# Patient Record
Sex: FEMALE | Race: WHITE | NOT HISPANIC OR LATINO
[De-identification: names, ages, dates, MRNs, and addresses within clinical notes are randomized per-mention and may not be internally consistent; named-entity substitution may affect disease eponyms.]

---

## 2022-09-26 ENCOUNTER — NON-APPOINTMENT (OUTPATIENT)
Age: 32
End: 2022-09-26

## 2022-09-26 ENCOUNTER — APPOINTMENT (OUTPATIENT)
Dept: MRI IMAGING | Facility: CLINIC | Age: 32
End: 2022-09-26

## 2022-09-26 ENCOUNTER — APPOINTMENT (OUTPATIENT)
Dept: ORTHOPEDIC SURGERY | Facility: CLINIC | Age: 32
End: 2022-09-26

## 2022-09-26 ENCOUNTER — OUTPATIENT (OUTPATIENT)
Dept: OUTPATIENT SERVICES | Facility: HOSPITAL | Age: 32
LOS: 1 days | End: 2022-09-26
Payer: COMMERCIAL

## 2022-09-26 DIAGNOSIS — Z00.00 ENCOUNTER FOR GENERAL ADULT MEDICAL EXAMINATION WITHOUT ABNORMAL FINDINGS: ICD-10-CM

## 2022-09-26 RX ORDER — ASPIRIN 325 MG/1
325 TABLET, FILM COATED ORAL DAILY
Qty: 30 | Refills: 1 | Status: ACTIVE | COMMUNITY
Start: 2022-09-26 | End: 1900-01-01

## 2022-09-26 NOTE — ADDENDUM
[FreeTextEntry1] : I, Leatha Magana, acted solely as a scribe for Dr. Chacho Espinoza on this date 09/26/2022.\par \par All medical record entries made by the Scribe were at my, Dr. Chacho Espinoza, direction and personally dictated by me on 09/26/2022. I have reviewed the chart and agree that the record accurately reflects my personal performance of the history, physical exam, assessment and plan. I have also personally directed, reviewed, and agreed with the chart.	\par

## 2022-09-26 NOTE — HISTORY OF PRESENT ILLNESS
[FreeTextEntry1] : The patient is a 31 year old female presenting with her spouse for an initial evaluation of left Achilles injury sustained yesterday. The patient reports that while playing soccer she felt as if she was kicked in the posterior aspect of her ankle. She confirms recent antibiotic utilization with respect to a Penicillin type derivative as well as iron. She obtained x-rays at an outside facility and presents today for follow-up. The patient presents ambulating with crutches. Of note, the patient is 4 months post partum and is currently breastfeeding. Pain is localized over the Achilles, with the patient rating her pain as a 3 out of 10. She notes throbbing sensations over her Achilles. No other complaints.

## 2022-09-26 NOTE — PHYSICAL EXAM
[de-identified] : General: Alert and oriented x3. In no acute distress. Pleasant in nature with a normal affect. No apparent respiratory distress.\par \par Left Ankle Exam\par Skin: Clean, dry, intact\par Inspection: No obvious malalignment, + swelling, no effusion; no lymphadenopathy\par Pulses: 2+ DP/PT pulses\par ROM:10 degrees of dorsiflexion, 40 degrees of plantarflexion, 10 degrees of subtalar motion\par Tenderness: Palpable defect middle to proximal third. No tenderness over the lateral malleolus, no CFL/ATFL/PTFL pain. No medial malleolus pain, no deltoid ligament pain. No proximal fibular pain. No heel pain.\par Stability: Negative anterior/posterior drawer.\par Strength: 5/5 TA/GS/EHL\par Neuro: In tact to light touch throughout\par Additional tests: Negative Mortons test, Negative syndesmosis squeeze test. Positive Hernandes's test.  [de-identified] : Xrays of left ankle obtained from outside facility on 9/25/2022 and reviewed in the office today, 09/26/2022 , revealed: No fractures seen. \par

## 2022-09-26 NOTE — DISCUSSION/SUMMARY
[de-identified] : Today I had a lengthy discussion with the patient regarding their left Achilles rupture. I have addressed all the patient's concerns surrounding the pathology of their condition. XR imaging results were reviewed with the patient. We discussed both operative and non-operative treatment options in great detail. At this time I would like to obtain advanced imaging of the patient's left ankle. An MRI was ordered so I can find out more about the etiology of the patient's condition. The patient should follow up with the office after obtaining the MRI.	\par \par In the interim, I recommended that the patient utilize a CAM boot with lifts and crutches. The patient was fitted for the CAM boot and lifts in the office today. The patient was educated about the boot wear pattern and utilization, as well as the timeframe to come out of the boot. She was also given full instructions for using the boot. The prescription for the Aspirin was provided for the patient in the office today. Discussed DVT prevention in detail in the office today. Recommended to the patient to follow-up with her Obstetrician in regards to Aspirin utilization as well as breastfeeding. Recommended to the patient to utilize ice PRN and elevation for swelling control. The patient understood and verbally agreed to the treatment plan. All of their questions were answered and they were satisfied with the visit. The patient should call the office if they have any questions or experience worsening symptoms.

## 2022-09-28 ENCOUNTER — APPOINTMENT (OUTPATIENT)
Dept: ORTHOPEDIC SURGERY | Facility: CLINIC | Age: 32
End: 2022-09-28

## 2022-09-28 DIAGNOSIS — Z01.818 ENCOUNTER FOR OTHER PREPROCEDURAL EXAMINATION: ICD-10-CM

## 2022-09-28 NOTE — PHYSICAL EXAM
[de-identified] : General: Alert and oriented x3. In no acute distress. Pleasant in nature with a normal affect. No apparent respiratory distress.\par \par Left Ankle Exam\par Skin: Clean, dry, intact\par Inspection: No obvious malalignment, + swelling, no effusion; no lymphadenopathy\par Pulses: 2+ DP/PT pulses\par ROM:10 degrees of dorsiflexion, 40 degrees of plantarflexion, 10 degrees of subtalar motion\par Tenderness: Palpable defect middle to proximal third. No tenderness over the lateral malleolus, no CFL/ATFL/PTFL pain. No medial malleolus pain, no deltoid ligament pain. No proximal fibular pain. No heel pain.\par Stability: Negative anterior/posterior drawer.\par Strength: 5/5 TA/GS/EHL\par Neuro: In tact to light touch throughout\par Additional tests: Negative Mortons test, Negative syndesmosis squeeze test. Positive Hernandes's test.  [de-identified] : EXAM: [id number] - MR ANKLE LT  - ORDERED BY:  JESSICA DOUGLAS\par \par \par PROCEDURE DATE:  09/26/2022\par \par \par \par INTERPRETATION:  MR ANKLE LEFT\par \par HISTORY:  Pain. Evaluate for Achilles tendon rupture. Ankle and calf pain.\par \par TECHNIQUE:  Multiplanar MRI of the left ankle was performed without contrast.\par \par COMPARISON:  None.\par \par FINDINGS:\par \par OSSEOUS STRUCTURES\par Acute Fractures/Contusions:  None.\par Chronic Fractures/Ossifications:  None.\par Tarsal Coalition:  None.\par \par LIGAMENTS\par Talofibular/Calcaneofibular Ligaments:  Intact.\par Tibiofibular/Syndesmotic Ligaments:  Intact.\par Medial Deltoid Ligament Complex:  Intact.\par Spring/Subtalar Ligaments:  Intact.\par \par TENDONS\par Posterior Tibialis/Medial Flexor Tendons:  Intact.\par Peroneal Tendons:  Intact.\par Extensor Tendons:  Intact.\par \par ACHILLES TENDON\par Achilles tendon is ruptured approximately 5.5 cm above the calcaneal attachment with approximately 2 cm tendon gap. A few lax fibers extend across the gap. There is mild to moderate increased T2 signal throughout the Achilles tendon. Fluid is present within the tendon gap with moderate peritendinous fluid.\par \par PLANTAR FASCIA\par Intact.\par \par JOINTS\par Tibiotalar Joint:  Preserved.\par Subtalar Joints:  Preserved.\par Intertarsal Joints:  Preserved.\par Tarsometatarsal Joints:  Preserved.\par \par SOFT TISSUES\par Masses/Cysts:  None.\par Musculature:  Intact.\par Subcutaneous Tissues:  There is mild scattered subcutaneous edema.\par \par NEUROVASCULAR STRUCTURES\par Tarsal Tunnel:  Preserved.\par \par IMPRESSION:\par 1. Achilles tendon is ruptured approximately 5.5 cm above the calcaneal attachment with approximately 2 cm tendon gap.\par \par --- End of Report ---\par \par HELEN BHATIA MD; Attending Radiologist\par This document has been electronically signed. Sep 26 2022  9:47PM

## 2022-09-28 NOTE — ADDENDUM
[FreeTextEntry1] : I, Leatha Magana, acted solely as a scribe for Dr. Chacho Espinoza on this date 09/28/2022.\par \par All medical record entries made by the Scribe were at my, Dr. Chacho Espinoza, direction and personally dictated by me on 09/28/2022. I have reviewed the chart and agree that the record accurately reflects my personal performance of the history, physical exam, assessment and plan. I have also personally directed, reviewed, and agreed with the chart.	\par

## 2022-09-28 NOTE — HISTORY OF PRESENT ILLNESS
[FreeTextEntry1] : 9/28/2022: The patient returns to the office to review MRI results of the left ankle. The patient presents ambulating with crutches and is wearing a CAM boot. She is taking Aspirin, however the patient reports that she received two separate opinions from her OB and Pediatrician in regards to Aspirin utilization. No change since the previous office visit. No other complaints.  \par \par 9/26/2022: The patient is a 31 year old female presenting with her spouse for an initial evaluation of left Achilles injury sustained yesterday. The patient reports that while playing soccer she felt as if she was kicked in the posterior aspect of her ankle. She confirms recent antibiotic utilization with respect to a Penicillin type derivative as well as iron. She obtained x-rays at an outside facility and presents today for follow-up. The patient presents ambulating with crutches. Of note, the patient is 4 months post partum and is currently breastfeeding. Pain is localized over the Achilles, with the patient rating her pain as a 3 out of 10. She notes throbbing sensations over her Achilles. No other complaints.

## 2022-09-28 NOTE — DISCUSSION/SUMMARY
[de-identified] : Today I had a lengthy discussion with the patient regarding their left Achilles rupture. I have addressed all the patient's concerns surrounding the pathology of their condition. MRI results were reviewed with the patient in the office today. We discussed both operative and non-operative treatment options in great detail. A lengthy discussion was had about the surgery. All risks, benefits and alternatives to the recommended surgical procedure were discussed which include but are not limited to bleeding, infection, nerve damage, vascular damage, failure of the wound to heal, the need for further surgery, loss of limb, DVT, PE, loss of life as well as the risks associated with general anesthesia. The patient verbalized understanding and provided informed consent to move forward with surgery. The patient understood and verbally agreed to the treatment plan. All of their questions were answered and they were satisfied with the visit. The patient should call the office if they have any questions or experience worsening symptoms.

## 2022-10-03 ENCOUNTER — TRANSCRIPTION ENCOUNTER (OUTPATIENT)
Age: 32
End: 2022-10-03

## 2022-10-03 LAB — SARS-COV-2 N GENE NPH QL NAA+PROBE: NOT DETECTED

## 2022-10-03 NOTE — ASU PATIENT PROFILE, ADULT - FALL HARM RISK - UNIVERSAL INTERVENTIONS
Bed in lowest position, wheels locked, appropriate side rails in place/Call bell, personal items and telephone in reach/Instruct patient to call for assistance before getting out of bed or chair/Non-slip footwear when patient is out of bed/[place] to call system/Physically safe environment - no spills, clutter or unnecessary equipment/Purposeful Proactive Rounding/Room/bathroom lighting operational, light cord in reach

## 2022-10-03 NOTE — ASU PATIENT PROFILE, ADULT - AS SC BRADEN MOISTURE
Review of Systems/Medical History  Patient summary reviewed  Chart reviewed  No history of anesthetic complications     Cardiovascular  Negative cardio ROS Exercise tolerance: poor,  Dysrhythmias, 3rd degree block,    Pulmonary  Negative pulmonary ROS        GI/Hepatic  Negative GI/hepatic ROS          Negative  ROS        Endo/Other  Negative endo/other ROS      GYN  Negative gynecology ROS          Hematology  Negative hematology ROS      Musculoskeletal    Arthritis     Neurology      Comment: Severe mental retardation Psychology   Negative psychology ROS              Physical Exam    Airway    Mallampati score: II  TM Distance: >3 FB  Neck ROM: full     Dental       Cardiovascular  Comment: Negative ROS,     Pulmonary      Other Findings  Multiple missing teeth  Patient not able to cooperate with exam      Anesthesia Plan  ASA Score- 4 Emergent    Anesthesia Type- IV sedation with anesthesia with ASA Monitors  Additional Monitors:   Airway Plan:     Comment: GA backup  Plan Factors-Patient not instructed to abstain from smoking on day of procedure  Patient did not smoke on day of surgery  Induction- intravenous  Postoperative Plan-     Informed Consent- Anesthetic plan and risks discussed with healthcare power of   I personally reviewed this patient with the CRNA  Discussed and agreed on the Anesthesia Plan with the CRNA  Gila Harp (4) rarely moist

## 2022-10-03 NOTE — ASU PATIENT PROFILE, ADULT - AS SC BRADEN MOBILITY
Tried calling pt, left message for pt to check [facility] messages for cancellation notice.  Pt advised to call PreAdmit to reschedule.  Routed to PreAdmit to cancel procedure for 9/10.   (3) slightly limited

## 2022-10-03 NOTE — ASU PATIENT PROFILE, ADULT - TEACHING/LEARNING CULTURAL CONSIDERATIONS
Please sign lamisil script    Talked to Yari Kline on hepatic function and taking of Lamisil and follow up in 2 months.  Pt verbalizes understanding none

## 2022-10-04 ENCOUNTER — TRANSCRIPTION ENCOUNTER (OUTPATIENT)
Age: 32
End: 2022-10-04

## 2022-10-04 ENCOUNTER — OUTPATIENT (OUTPATIENT)
Dept: INPATIENT UNIT | Facility: HOSPITAL | Age: 32
LOS: 1 days | Discharge: HOME | End: 2022-10-04
Payer: COMMERCIAL

## 2022-10-04 ENCOUNTER — APPOINTMENT (OUTPATIENT)
Dept: ORTHOPEDIC SURGERY | Facility: HOSPITAL | Age: 32
End: 2022-10-04

## 2022-10-04 VITALS
HEART RATE: 77 BPM | DIASTOLIC BLOOD PRESSURE: 73 MMHG | TEMPERATURE: 98 F | OXYGEN SATURATION: 98 % | SYSTOLIC BLOOD PRESSURE: 106 MMHG | RESPIRATION RATE: 16 BRPM

## 2022-10-04 VITALS
WEIGHT: 139.99 LBS | SYSTOLIC BLOOD PRESSURE: 103 MMHG | HEART RATE: 75 BPM | TEMPERATURE: 98 F | HEIGHT: 65 IN | RESPIRATION RATE: 15 BRPM | OXYGEN SATURATION: 100 % | DIASTOLIC BLOOD PRESSURE: 72 MMHG

## 2022-10-04 DIAGNOSIS — S86.012A STRAIN OF LEFT ACHILLES TENDON, INITIAL ENCOUNTER: ICD-10-CM

## 2022-10-04 LAB — HCG UR QL: NEGATIVE — SIGNIFICANT CHANGE UP

## 2022-10-04 RX ORDER — SODIUM CHLORIDE 9 MG/ML
1000 INJECTION, SOLUTION INTRAVENOUS
Refills: 0 | Status: DISCONTINUED | OUTPATIENT
Start: 2022-10-04 | End: 2022-10-04

## 2022-10-04 RX ORDER — ASPIRIN/CALCIUM CARB/MAGNESIUM 324 MG
1 TABLET ORAL
Qty: 0 | Refills: 0 | DISCHARGE

## 2022-10-04 RX ORDER — ONDANSETRON 8 MG/1
4 TABLET, FILM COATED ORAL ONCE
Refills: 0 | Status: DISCONTINUED | OUTPATIENT
Start: 2022-10-04 | End: 2022-10-04

## 2022-10-04 RX ORDER — OXYCODONE HYDROCHLORIDE 5 MG/1
5 TABLET ORAL ONCE
Refills: 0 | Status: DISCONTINUED | OUTPATIENT
Start: 2022-10-04 | End: 2022-10-04

## 2022-10-04 RX ORDER — FENTANYL CITRATE 50 UG/ML
50 INJECTION INTRAVENOUS
Refills: 0 | Status: DISCONTINUED | OUTPATIENT
Start: 2022-10-04 | End: 2022-10-04

## 2022-10-04 RX ORDER — ASPIRIN/CALCIUM CARB/MAGNESIUM 324 MG
1 TABLET ORAL
Qty: 30 | Refills: 1
Start: 2022-10-04 | End: 2022-12-02

## 2022-10-04 RX ORDER — OXYCODONE HYDROCHLORIDE 5 MG/1
1 TABLET ORAL
Qty: 20 | Refills: 0
Start: 2022-10-04 | End: 2022-10-10

## 2022-10-04 RX ORDER — DOCUSATE SODIUM 100 MG
1 CAPSULE ORAL
Qty: 42 | Refills: 0
Start: 2022-10-04 | End: 2022-10-17

## 2022-10-04 RX ORDER — ONDANSETRON 8 MG/1
1 TABLET, FILM COATED ORAL
Qty: 10 | Refills: 0
Start: 2022-10-04 | End: 2022-10-10

## 2022-10-04 NOTE — ASU DISCHARGE PLAN (ADULT/PEDIATRIC) - ASU DC SPECIAL INSTRUCTIONSFT
Post-Operative Instructions    PRESCRIPTIONS: your post-operative medications have been handed to you or sent to the pharmacy you indicated at your pre-operative visit.  If you have any difficulty obtaining your post-operative medications or have any questions, please call the office at [phone].      PAIN MANAGEMENT: You should expect to have discomfort for the first week or so after surgery. Pain medication should be taken to help alleviate the pain so that you are comfortable and can participate in physical therapy.  Take the medication as directed.  You may decrease the amount of pain medication, as tolerated, when pain improves.  You must exercise caution when operating a motor vehicle. You have been prescribed one or more of the following as indicated on your Med Rec form thta the Nurse will go over with you:  [ X ] Oxycodone 5mg 1-2 tablets by mouth every 4 to 6 hours as needed for pain  Oxycodone is a short-acting pain medication routinely prescribed after surgery.  It is the pain medication found in “Percocet,” which is a combination of oxycodone and Tylenol.  If you were prescribed oxycodone, you may take Tylenol in addition to this medication, if needed for pain control.  [  ] Oxycontin 10mg by mouth every 12 hours for 5 days  Oxycontin is a long-acting pain medication.  It is sometimes prescribed after longer surgeries. If you were prescribed this medication, you should take it twice a day for the first 5 days after surgery to help with pain control.  [  ] Vicodin or Norco (Hydrocodone 5mg/Tylenol 325mg) 1-2 tablets by mouth every 4-6 hours as needed for pain  Vicodin and Norco are short acting pain medications sometimes prescribed after surgery.  If you were prescribed this medication, you may take 1-2 tablets every 4-6 hours as needed for pain.  This medication already contains Tylenol.  You should NOT take any additional Tylenol (acetaminophen) if you are taking these medications.    NAUSEA: Nausea is a common side effect of anesthesia and pain medications.  You may take the below medication if you are experiencing nausea after surgery.  If you continue to experience nausea or vomiting more than 24 hours after surgery, please call the office.  [ X ] Ondansetron 4mg by mouth every 4 hours as needed for nausea    CONSTIPATION: common side effect of anesthesia and pain medications.  You should take Colace three times daily, as long as you are taking narcotic pain medications after surgery, such as oxycodone, oxycontin, vicodin, or norco.  [ X ] Colace 100mg by mouth three times daily    DVT PROPHYLAXIS (PREVENTION OF BLOOD CLOTS): Aspirin EC can reduce the risk of blood clots after surgery, particularly after surgery on the legs, ankles and feet.  If you have been prescribed Aspirn, it is essential that you take this medication.  If you already are on an anticoagulant (blood thinner) such as Xarelto, Coumadin, Warfarin, Eliquis - you should resume you home "blood-thinner" in place of the Aspirn.  [ X ] Aspirin 325mg ENTERIC COATED (EC) by mouth once daily for 4 weeks (depending on surgical procedure)    ACTIVITY: You should be up and moving as much and as often as possible! Do NOT walk or put bodyweight on your splint or surgical side. Use Crutches or walker. You must keep your bandage/splint dry. Do NOT get it wet. IF you shower it MUST be covered tightly with a garbage bag. Otherwise sponge bath is advised. You do NOT want wet bandages on your skin as they can cause skin breakdown under the splint.    BANDAGE: Your bandage will be changed in the office. Do NOT remove it yourself. IF it gets damaged or wet (soaked) call. Follow up about 7-10 days in office or as otherwise advised by Dr. Espinoza if different.

## 2022-10-04 NOTE — ASU DISCHARGE PLAN (ADULT/PEDIATRIC) - NS MD DC FALL RISK RISK
For information on Fall & Injury Prevention, visit: [email or web address] OR  [email or web address] OR  https://www.cdc.gov/steadi/patient.html

## 2022-10-04 NOTE — ASU DISCHARGE PLAN (ADULT/PEDIATRIC) - CARE PROVIDER_API CALL
Chacho Espinoza (DO)  Orthopaedic Surgery  [address] [place] [address]  Phone: [phone]  Fax: [phone]  Follow Up Time:

## 2022-10-06 DIAGNOSIS — Z79.82 LONG TERM (CURRENT) USE OF ASPIRIN: ICD-10-CM
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
## 2022-10-06 DIAGNOSIS — S86.012A STRAIN OF LEFT ACHILLES TENDON, INITIAL ENCOUNTER: ICD-10-CM
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
## 2022-10-19 ENCOUNTER — APPOINTMENT (OUTPATIENT)
Dept: ORTHOPEDIC SURGERY | Facility: CLINIC | Age: 32
End: 2022-10-19

## 2022-10-19 PROBLEM — S86.019A STRAIN OF UNSPECIFIED ACHILLES TENDON, INITIAL ENCOUNTER: Chronic | Status: ACTIVE | Noted: 2022-10-03

## 2022-10-19 NOTE — HISTORY OF PRESENT ILLNESS
[___ Weeks Post Op] : [unfilled] weeks post op [Doing Well] : is doing well [Excellent Pain Control] : has excellent pain control [No Sign of Infection] : is showing no signs of infection [Healed] : healed [Swelling] : swollen [Neuro Intact] : an unremarkable neurological exam [Vascular Intact] : ~T peripheral vascular exam normal [Negative Christopher's] : maneuvers demonstrated a negative Christopher's sign [Sutures Removed] : sutures were removed [Steri-Strips Removed & Replaced] : steri-strips removed and replaced [Chills] : no chills [Fever] : no fever [Nausea] : no nausea [Vomiting] : no vomiting [Erythema] : not erythematous [Discharge] : absent of discharge [Dehiscence] : not dehisced [de-identified] : s/p open repair of left Achilles tendon\par DOS: 10/04/2022 [de-identified] : 32 year old  female presenting in the office 2 weeks post op from open repair of left Achilles tendon. The patient's pain is noted to be a well controlled. She is on Aspirin for DVT Prophylaxis. She is not currently taking any pain medication. No other complaints at this time.		\par \par The patient presents ambulating with crutches.	 [de-identified] : General: Alert and oriented x3. In no acute distress. Pleasant in nature with a normal affect. No apparent respiratory distress.\par The wound is intact. no evidence of any diastases or dehiscence. No surrounding erythema noted. No fluctuance. The area is warm and well perfused. The patient is able to wiggle their toes. Neurovascularly intact.		\par \par The incisions were clean, dry, intact, and well healed. The sutures were removed today.  [de-identified] : No new imaging was obtained.  [de-identified] : 32 year old  female presenting in the office 2 weeks post op from open repair of left Achilles tendon [de-identified] : 32 year old  female presenting in the office 2 weeks post op from open repair of left Achilles tendon.\par \par Wound care was discussed and reviewed with the patient. I recommend the patient undergo a course of physical therapy for the left Achilles  2-3 times a week for a total of 8-12 weeks. A prescription was given for the physical therapy today. Follow PT protocol. I recommended that the patient continue to utilize a CAM boot. The patient was educated about the boot wear pattern and utilization, as well as the timeframe to come out of the boot. She was also given full instructions for using the boot. Continue to utilize the Aspirin 325 mg as directed for DVT Prophylaxis while in the CAM boot. \par \par I have addressed all the patient's concerns surrounding the pathology of their condition. The patient understood and verbally agreed to the treatment plan. All of their questions were answered and they were satisfied with the visit. The patient should call the office if they have any questions or experience worsening symptoms.\par \par Follow up in 4-6 weeks for re-evaluation.

## 2022-10-19 NOTE — ADDENDUM
[FreeTextEntry1] : I, Leatha Magana, acted solely as a scribe for Dr. Chacho Espinoza on this date 10/19/2022.\par \par All medical record entries made by the Scribe were at my, Dr. Chacho Espinoza, direction and personally dictated by me on 10/19/2022. I have reviewed the chart and agree that the record accurately reflects my personal performance of the history, physical exam, assessment and plan. I have also personally directed, reviewed, and agreed with the chart.	\par

## 2022-11-16 ENCOUNTER — APPOINTMENT (OUTPATIENT)
Dept: ORTHOPEDIC SURGERY | Facility: CLINIC | Age: 32
End: 2022-11-16

## 2022-11-16 NOTE — ADDENDUM
[FreeTextEntry1] : I, Leatha Magana, acted solely as a scribe for Dr. Chacho Espinoza on this date 11/16/2022.\par \par All medical record entries made by the Scribe were at my, Dr. Chacho Espinoza, direction and personally dictated by me on 11/16/2022. I have reviewed the chart and agree that the record accurately reflects my personal performance of the history, physical exam, assessment and plan. I have also personally directed, reviewed, and agreed with the chart.	\par

## 2022-11-16 NOTE — HISTORY OF PRESENT ILLNESS
[___ Weeks Post Op] : [unfilled] weeks post op [Healed] : healed [Swelling] : swollen [Neuro Intact] : an unremarkable neurological exam [Vascular Intact] : ~T peripheral vascular exam normal [Negative Christopher's] : maneuvers demonstrated a negative Christopher's sign [Doing Well] : is doing well [Excellent Pain Control] : has excellent pain control [No Sign of Infection] : is showing no signs of infection [Sutures Removed] : sutures were removed [Steri-Strips Removed & Replaced] : steri-strips removed and replaced [Chills] : no chills [Fever] : no fever [Nausea] : no nausea [Vomiting] : no vomiting [Erythema] : not erythematous [Discharge] : absent of discharge [Dehiscence] : not dehisced [de-identified] : s/p open repair of left Achilles tendon\par DOS: 10/04/2022 [de-identified] : 32 year old  female presenting in the office 6 weeks post op from open repair of left Achilles tendon. The patient's pain is noted to be a well controlled, improving. She is on Aspirin for DVT Prophylaxis. She is not currently taking any pain medication. Of note, the patient reports cramping sensations to her calf. She denies any swelling or redness to her calf. No other complaints at this time.		\par The patient presents wearing a CAM boot. The patient has been attending physical therapy for this issue. She reports that the lifts have been removed.  [de-identified] : General: Alert and oriented x3. In no acute distress. Pleasant in nature with a normal affect. No apparent respiratory distress.\par The wound is intact. no evidence of any diastases or dehiscence. No surrounding erythema noted. No fluctuance. The area is warm and well perfused. The patient is able to wiggle their toes. Neurovascularly intact.		\par \par The incisions were clean, dry, intact, and well healed. The sutures were removed previously.  [de-identified] : No new imaging was obtained.  [de-identified] : 32 year old  female presenting in the office 6 weeks post op from open repair of left Achilles tendon [de-identified] : 32 year old  female presenting in the office 6 weeks post op from open repair of left Achilles tendon.\par \par At this time, I recommend the patient undergo a course of physical therapy for the left Achilles  2-3 times a week for a total of 8-12 weeks. A prescription was given for the physical therapy today. I recommended that the patient begin to transition out of the CAM boot to an ASO brace. The ASO brace was given today. Recommended to the patient to continue with a HEP for her lower extremities. Continue with the blood thinners for DVT Prophylaxis as instructed until the patient transitions out of the CAM boot. Continue to ice and elevate the LLE above the level of the heart. \par \par No indications for DVT study at this time. Possible DVT US study. \par \par I have addressed all the patient's concerns surrounding the pathology of their condition. The patient understood and verbally agreed to the treatment plan. All of their questions were answered and they were satisfied with the visit. The patient should call the office if they have any questions or experience worsening symptoms.\par \par Follow up in 2-3 months for re-evaluation.

## 2023-01-18 ENCOUNTER — APPOINTMENT (OUTPATIENT)
Dept: ORTHOPEDIC SURGERY | Facility: CLINIC | Age: 33
End: 2023-01-18
Payer: COMMERCIAL

## 2023-01-18 NOTE — PHYSICAL EXAM
[de-identified] : General: Alert and oriented x3. In no acute distress. Pleasant in nature with a normal affect. No apparent respiratory distress.\par Left Ankle Exam\par Skin: Clean, dry, intact\par Inspection: No obvious malalignment, no swelling, no effusion; no lymphadenopathy\par Pulses: 2+ DP/PT pulses\par ROM: Left Ankle 10 degrees of dorsiflexion, 40 degrees of plantarflexion, 35 degress of inversion, 35 degrees of eversion, 10 degrees of subtalar motion\par Tenderness: No tenderness over the lateral malleolus, no CFL/ATFL/PTFL pain. No medial malleolus pain, no deltoid ligament pain. No proximal fibular pain. No heel pain.\par Stability: Negative anterior/posterior drawer.\par Strength: 5/5 TA/GS/EHL\par Neuro: In tact to light touch throughout\par Additional tests: Negative Mortons test, Negative syndesmosis squeeze test. Negative Homans sign\par  [de-identified] : No new imaging.

## 2023-01-18 NOTE — HISTORY OF PRESENT ILLNESS
[FreeTextEntry1] : 3.5 months post op. s/p open repair of left Achilles tendon\par DOS: 10/04/2022. \par \par 1/18/23: The patient is a 32 year old female who presents for a follow-up visit s/p open repair of her left Achilles tendon.  The patient states that she feels weakness of the ankle.  She continues with PT 2x per week.  She has no numbness or tingling of the ankle and foot.  Some slight pain back well.

## 2023-01-18 NOTE — REASON FOR VISIT
[Follow-Up Visit] : a follow-up visit for [FreeTextEntry2] : s/p open repair of left Achilles tendon DOS: 10/04/2022

## 2023-01-18 NOTE — DISCUSSION/SUMMARY
[de-identified] : Today I had a lengthy discussion with the patient regarding their left Achilles Tendon.I have addressed all the patient's concerns surrounding the pathology of their condition. I recommend the patient continue with a course of physical therapy for the left ankle 2-3 times a week for a total of 8-12 weeks. A prescription was given for the physical therapy today. I recommend that the patient utilize ice, NSAIDS PRN, and heat. The patient understood and verbally agreed to the treatment plan. All of their questions were answered and they were satisfied with the visit. The patient should call the office if they have any questions or experience worsening symptoms.\par \par I would like to see the patient back in the office in 3 months to reassess their condition. 	\par \par

## 2023-04-13 ENCOUNTER — APPOINTMENT (OUTPATIENT)
Dept: ORTHOPEDIC SURGERY | Facility: CLINIC | Age: 33
End: 2023-04-13
Payer: COMMERCIAL

## 2023-04-13 NOTE — DISCUSSION/SUMMARY
[de-identified] : Today I had a lengthy discussion with the patient regarding their left Achilles tendon. I have addressed all the patient's concerns surrounding the pathology of their condition. Patient can begin jogging and transitioning into running and she can resume to use her peloton. I recommend the patient undergo a course of physical therapy for the left ankle  2-3 times a week for a total of 8-12 weeks. A prescription was given for the physical therapy today. \par \par The patient understood and verbally agreed to the treatment plan. All of their questions were answered and they were satisfied with the visit. The patient should call the office if they have any questions or experience worsening symptoms. \par \par Follow up in 3 months.

## 2023-04-13 NOTE — PHYSICAL EXAM
[de-identified] : General: Alert and oriented x3. In no acute distress. Pleasant in nature with a normal affect. No apparent respiratory distress.\par \par Left Ankle Exam\par \par Skin: Clean, dry, intact\par Inspection: No obvious malalignment, no swelling, no effusion; no lymphadenopathy\par Pulses: 2+ DP/PT pulses\par ROM: Left Ankle 10 degrees of dorsiflexion, 40 degrees of plantarflexion, 35 degress of inversion, 35 degrees of eversion, 10 degrees of subtalar motion\par Tenderness: No tenderness over the lateral malleolus, no CFL/ATFL/PTFL pain. No medial malleolus pain, no deltoid ligament pain. No proximal fibular pain. No heel pain.\par Stability: Negative anterior/posterior drawer.\par Strength: 5/5 TA/GS/EHL\par Neuro: In tact to light touch throughout\par Additional tests: Negative Hernandes's, Negative Mortons test, Negative syndesmosis squeeze test. Negative Homans sign\par  [de-identified] : No new imaging.

## 2023-04-13 NOTE — ADDENDUM
[FreeTextEntry1] : I, KYLIE WARE, acted solely as a scribe for Dr. Chacho Espinoza on this date 04/13/2023  .\par  \par All medical record entries made by the Scribe were at my, Dr. Chacho Espinoza, direction and personally dictated by me on 04/13/2023 . I have reviewed the chart and agree that the record accurately reflects my personal performance of the history, physical exam, assessment and plan. I have also personally directed, reviewed, and agreed with the chart.

## 2023-04-13 NOTE — HISTORY OF PRESENT ILLNESS
[FreeTextEntry1] : 6 months post op. s/p open repair of left Achilles tendon\par DOS: 10/04/2022. \par \par 4/13/2023: the patient is a 32 year old female presenting for a follow up visit of her left Achilles tendon. the patient states that she was discharged from physical therapy. She states that her symptoms have improved since her previous.She continues to experience morning discomfort and tightness. She does feel general fatigue after being on her feet for prolonged periods of time. She presents wearing sneakers and is ambulating without assistance. No other complaints. \par \par 1/18/23: The patient is a 32 year old female who presents for a follow-up visit s/p open repair of her left Achilles tendon.  The patient states that she feels weakness of the ankle.  She continues with PT 2x per week.  She has no numbness or tingling of the ankle and foot.  Some slight pain back well.

## 2023-07-13 ENCOUNTER — APPOINTMENT (OUTPATIENT)
Dept: ORTHOPEDIC SURGERY | Facility: CLINIC | Age: 33
End: 2023-07-13
Payer: COMMERCIAL

## 2023-07-13 DIAGNOSIS — S86.012A STRAIN OF LEFT ACHILLES TENDON, INITIAL ENCOUNTER: ICD-10-CM

## 2023-07-13 NOTE — HISTORY OF PRESENT ILLNESS
[FreeTextEntry1] : 9 months post op. s/p open repair of left Achilles tendon\par DOS: 10/04/2022. \par \par 7/13/23: The patient is a 32-year-old female status post left Achilles tendon repair 9 months ago.  She is walking without assistance.  The only complaint she has is some discomfort and stiffness in the morning and then the ankle works itself out.  She has stopped physical therapy and performs home exercises.  She is riding her Peloton but has not standard for power just yet.  She is happy with the outcome of her surgery.  She has no other complaints.\par \par 4/13/2023: the patient is a 32 year old female presenting for a follow up visit of her left Achilles tendon. the patient states that she was discharged from physical therapy. She states that her symptoms have improved since her previous.She continues to experience morning discomfort and tightness. She does feel general fatigue after being on her feet for prolonged periods of time. She presents wearing sneakers and is ambulating without assistance. No other complaints. \par \par 1/18/23: The patient is a 32 year old female who presents for a follow-up visit s/p open repair of her left Achilles tendon.  The patient states that she feels weakness of the ankle.  She continues with PT 2x per week.  She has no numbness or tingling of the ankle and foot.  Some slight pain back well.

## 2023-07-13 NOTE — DISCUSSION/SUMMARY
[de-identified] : Patient will continue with home exercises and increase her activity as tolerated.  She has no restrictions in regards to physical activity at this time.  I explained to her that an Achilles tendon can take up to 1 year to fully heal and possibly even longer.  If she does have pain and discomfort I prescribed her diclofenac sodium 1% gel to use as needed and as directed.  All of her questions were answered.  She can follow-up on an as-needed basis for her left ankle.\par \par The patient understood and verbally agreed to the treatment plan. All of their questions were answered and they were satisfied with the visit. The patient should call the office if they have any questions or experience worsening symptoms. \par \par

## 2023-07-13 NOTE — PHYSICAL EXAM
[de-identified] : Left ankle Physical Examination:\par \par General: Alert and oriented x3.  In no acute distress.  Pleasant in nature with a normal affect.  No apparent respiratory distress. \par Erythema, Warmth, Rubor: Negative\par Swelling: Negative\par \par Incision healed.\par \par ROM:\par 1. Dorsiflexion: 10 degrees\par 2. Plantarflexion: 40 degrees\par 3. Inversion: 30 degrees\par 4. Eversion: 20 degrees\par \par Tenderness to Palpation: \par 1. Lateral Malleolus: Negative\par 2. Medial Malleolus: Negative\par 3. Proximal Fibular Pain: Negative\par 4. Heel Pain: Negative\par 5. Cuboid: Negative\par 6. Navicular: Negative\par 7. Tibiotalar Joint: Negative\par 8. Subtalar Joint: Negative\par 9. Posterior Recess: Negative\par \par Tendon Pain:\par 1. Achilles: Negative\par 2. Peroneals: Negative\par 3. Posterior Tibialis: Negative\par 4. Tibialis Anterior: Negative\par \par Ligament Pain:\par 1. ATFL: Negative\par 2. CFL: Negative \par 3. PTFL: Negative\par 4. Deltoid Ligaments: Negative\par 5. Lis Franc Ligament: Negative\par \par Stability: \par 1. Anterior Drawer: Negative\par 2. Posterior Drawer: Negative\par \par Strength: 5/5 TA/GS/EHL\par \par Pulses: 2+ DP/PT Pulses\par \par Neuro: Intact motor and sensory\par \par Additional Test:\par 1. Calcaneal Squeeze Test: Negative\par 2. Syndesmosis Squeeze Test: Negative [de-identified] : No new imaging.

## 2024-02-09 ENCOUNTER — NON-APPOINTMENT (OUTPATIENT)
Age: 34
End: 2024-02-09

## 2024-02-14 ENCOUNTER — APPOINTMENT (OUTPATIENT)
Dept: OBGYN | Facility: CLINIC | Age: 34
End: 2024-02-14
Payer: COMMERCIAL

## 2024-02-14 VITALS
WEIGHT: 160 LBS | BODY MASS INDEX: 26.66 KG/M2 | SYSTOLIC BLOOD PRESSURE: 101 MMHG | HEIGHT: 65 IN | DIASTOLIC BLOOD PRESSURE: 63 MMHG

## 2024-02-14 DIAGNOSIS — Z34.90 ENCOUNTER FOR SUPERVISION OF NORMAL PREGNANCY, UNSPECIFIED, UNSPECIFIED TRIMESTER: ICD-10-CM

## 2024-02-14 DIAGNOSIS — E03.9 HYPOTHYROIDISM, UNSPECIFIED: ICD-10-CM

## 2024-02-15 PROBLEM — E03.9 HYPOTHYROIDISM, UNSPECIFIED TYPE: Status: ACTIVE | Noted: 2024-02-14

## 2024-02-25 ENCOUNTER — NON-APPOINTMENT (OUTPATIENT)
Age: 34
End: 2024-02-25

## 2024-02-25 LAB
AFP MOM: 0.63
AFP VALUE: 20.3 NG/ML
ALPHA FETOPROTEIN SERUM COMMENT: NORMAL
ALPHA FETOPROTEIN SERUM INTERPRETATION: NORMAL
ALPHA FETOPROTEIN SERUM RESULTS: NORMAL
ALPHA FETOPROTEIN SERUM TEST RESULTS: NORMAL
GESTATIONAL AGE BASED ON: NORMAL
GESTATIONAL AGE ON COLLECTION DATE: 16 WEEKS
INSULIN DEP DIABETES: NO
MATERNAL AGE AT EDD AFP: 33.8 YR
MULTIPLE GESTATION: NO
OSBR RISK 1 IN: NORMAL
RACE: NORMAL
T4 FREE SERPL-MCNC: 1.2 NG/DL
TSH SERPL-ACNC: 1.32 UIU/ML
WEIGHT AFP: 160 LBS

## 2024-02-29 RX ORDER — LEVOTHYROXINE SODIUM 0.07 MG/1
75 TABLET ORAL DAILY
Qty: 90 | Refills: 2 | Status: ACTIVE | COMMUNITY
Start: 2024-02-29 | End: 1900-01-01

## 2024-03-11 ENCOUNTER — NON-APPOINTMENT (OUTPATIENT)
Age: 34
End: 2024-03-11

## 2024-03-14 ENCOUNTER — ASOB RESULT (OUTPATIENT)
Age: 34
End: 2024-03-14

## 2024-03-14 ENCOUNTER — NON-APPOINTMENT (OUTPATIENT)
Age: 34
End: 2024-03-14

## 2024-03-14 ENCOUNTER — APPOINTMENT (OUTPATIENT)
Dept: ANTEPARTUM | Facility: CLINIC | Age: 34
End: 2024-03-14
Payer: COMMERCIAL

## 2024-03-15 ENCOUNTER — APPOINTMENT (OUTPATIENT)
Dept: OBGYN | Facility: CLINIC | Age: 34
End: 2024-03-15
Payer: COMMERCIAL

## 2024-03-15 VITALS
HEIGHT: 65 IN | WEIGHT: 158 LBS | SYSTOLIC BLOOD PRESSURE: 110 MMHG | DIASTOLIC BLOOD PRESSURE: 70 MMHG | BODY MASS INDEX: 26.33 KG/M2

## 2024-04-09 ENCOUNTER — NON-APPOINTMENT (OUTPATIENT)
Age: 34
End: 2024-04-09

## 2024-04-10 ENCOUNTER — NON-APPOINTMENT (OUTPATIENT)
Age: 34
End: 2024-04-10

## 2024-04-10 ENCOUNTER — APPOINTMENT (OUTPATIENT)
Dept: OBGYN | Facility: CLINIC | Age: 34
End: 2024-04-10
Payer: COMMERCIAL

## 2024-04-10 VITALS
SYSTOLIC BLOOD PRESSURE: 124 MMHG | WEIGHT: 161 LBS | DIASTOLIC BLOOD PRESSURE: 76 MMHG | HEIGHT: 65 IN | BODY MASS INDEX: 26.82 KG/M2

## 2024-04-10 DIAGNOSIS — Z34.91 ENCOUNTER FOR SUPERVISION OF NORMAL PREGNANCY, UNSPECIFIED, FIRST TRIMESTER: ICD-10-CM

## 2024-04-28 ENCOUNTER — NON-APPOINTMENT (OUTPATIENT)
Age: 34
End: 2024-04-28

## 2024-04-28 LAB
BASOPHILS # BLD AUTO: 0.03 K/UL
BASOPHILS NFR BLD AUTO: 0.4 %
EOSINOPHIL # BLD AUTO: 0.4 K/UL
EOSINOPHIL NFR BLD AUTO: 4.7 %
GLUCOSE 1H P 50 G GLC PO SERPL-MCNC: 132 MG/DL
HCT VFR BLD CALC: 39.1 %
HGB BLD-MCNC: 12.7 G/DL
IMM GRANULOCYTES NFR BLD AUTO: 0.4 %
LYMPHOCYTES # BLD AUTO: 1.07 K/UL
LYMPHOCYTES NFR BLD AUTO: 12.5 %
MAN DIFF?: NORMAL
MCHC RBC-ENTMCNC: 29.9 PG
MCHC RBC-ENTMCNC: 32.5 GM/DL
MCV RBC AUTO: 92 FL
MONOCYTES # BLD AUTO: 0.37 K/UL
MONOCYTES NFR BLD AUTO: 4.3 %
NEUTROPHILS # BLD AUTO: 6.63 K/UL
NEUTROPHILS NFR BLD AUTO: 77.7 %
PLATELET # BLD AUTO: 175 K/UL
RBC # BLD: 4.25 M/UL
RBC # FLD: 14.1 %
T4 FREE SERPL-MCNC: 1.3 NG/DL
TSH SERPL-ACNC: 1.3 UIU/ML
WBC # FLD AUTO: 8.53 K/UL

## 2024-05-07 ENCOUNTER — APPOINTMENT (OUTPATIENT)
Dept: OBGYN | Facility: CLINIC | Age: 34
End: 2024-05-07

## 2024-05-10 ENCOUNTER — NON-APPOINTMENT (OUTPATIENT)
Age: 34
End: 2024-05-10

## 2024-05-10 ENCOUNTER — APPOINTMENT (OUTPATIENT)
Dept: OBGYN | Facility: CLINIC | Age: 34
End: 2024-05-10
Payer: COMMERCIAL

## 2024-05-10 DIAGNOSIS — Z34.92 ENCOUNTER FOR SUPERVISION OF NORMAL PREGNANCY, UNSPECIFIED, SECOND TRIMESTER: ICD-10-CM

## 2024-05-15 ENCOUNTER — APPOINTMENT (OUTPATIENT)
Dept: ANTEPARTUM | Facility: CLINIC | Age: 34
End: 2024-05-15
Payer: COMMERCIAL

## 2024-05-15 ENCOUNTER — ASOB RESULT (OUTPATIENT)
Age: 34
End: 2024-05-15

## 2024-05-16 ENCOUNTER — NON-APPOINTMENT (OUTPATIENT)
Age: 34
End: 2024-05-16

## 2024-05-21 ENCOUNTER — APPOINTMENT (OUTPATIENT)
Dept: OBGYN | Facility: CLINIC | Age: 34
End: 2024-05-21
Payer: COMMERCIAL

## 2024-05-21 VITALS
SYSTOLIC BLOOD PRESSURE: 107 MMHG | BODY MASS INDEX: 27.66 KG/M2 | WEIGHT: 166 LBS | HEIGHT: 65 IN | DIASTOLIC BLOOD PRESSURE: 69 MMHG

## 2024-06-03 ENCOUNTER — NON-APPOINTMENT (OUTPATIENT)
Age: 34
End: 2024-06-03

## 2024-06-03 ENCOUNTER — APPOINTMENT (OUTPATIENT)
Dept: OBGYN | Facility: CLINIC | Age: 34
End: 2024-06-03
Payer: COMMERCIAL

## 2024-06-03 VITALS
SYSTOLIC BLOOD PRESSURE: 104 MMHG | HEIGHT: 65 IN | WEIGHT: 168 LBS | BODY MASS INDEX: 27.99 KG/M2 | DIASTOLIC BLOOD PRESSURE: 65 MMHG

## 2024-06-03 DIAGNOSIS — Z23 ENCOUNTER FOR IMMUNIZATION: ICD-10-CM

## 2024-06-06 ENCOUNTER — ASOB RESULT (OUTPATIENT)
Age: 34
End: 2024-06-06

## 2024-06-06 ENCOUNTER — APPOINTMENT (OUTPATIENT)
Dept: ANTEPARTUM | Facility: CLINIC | Age: 34
End: 2024-06-06
Payer: COMMERCIAL

## 2024-06-14 ENCOUNTER — NON-APPOINTMENT (OUTPATIENT)
Age: 34
End: 2024-06-14

## 2024-06-17 ENCOUNTER — APPOINTMENT (OUTPATIENT)
Dept: OBGYN | Facility: CLINIC | Age: 34
End: 2024-06-17
Payer: COMMERCIAL

## 2024-06-17 VITALS
DIASTOLIC BLOOD PRESSURE: 73 MMHG | BODY MASS INDEX: 28.16 KG/M2 | SYSTOLIC BLOOD PRESSURE: 107 MMHG | WEIGHT: 169 LBS | HEIGHT: 65 IN

## 2024-06-17 DIAGNOSIS — O99.280 ENDOCRINE, NUTRITIONAL AND METABOLIC DISEASES COMPLICATING PREGNANCY, UNSPECIFIED TRIMESTER: ICD-10-CM

## 2024-06-17 DIAGNOSIS — Z34.93 ENCOUNTER FOR SUPERVISION OF NORMAL PREGNANCY, UNSPECIFIED, THIRD TRIMESTER: ICD-10-CM

## 2024-06-17 DIAGNOSIS — E03.9 ENDOCRINE, NUTRITIONAL AND METABOLIC DISEASES COMPLICATING PREGNANCY, UNSPECIFIED TRIMESTER: ICD-10-CM

## 2024-07-01 ENCOUNTER — NON-APPOINTMENT (OUTPATIENT)
Age: 34
End: 2024-07-01

## 2024-07-02 ENCOUNTER — APPOINTMENT (OUTPATIENT)
Dept: OBGYN | Facility: CLINIC | Age: 34
End: 2024-07-02
Payer: COMMERCIAL

## 2024-07-02 VITALS
HEIGHT: 65 IN | SYSTOLIC BLOOD PRESSURE: 115 MMHG | DIASTOLIC BLOOD PRESSURE: 74 MMHG | WEIGHT: 172 LBS | BODY MASS INDEX: 28.66 KG/M2

## 2024-07-08 ENCOUNTER — APPOINTMENT (OUTPATIENT)
Dept: ANTEPARTUM | Facility: CLINIC | Age: 34
End: 2024-07-08

## 2024-07-10 ENCOUNTER — ASOB RESULT (OUTPATIENT)
Age: 34
End: 2024-07-10

## 2024-07-10 ENCOUNTER — APPOINTMENT (OUTPATIENT)
Dept: OBGYN | Facility: CLINIC | Age: 34
End: 2024-07-10
Payer: COMMERCIAL

## 2024-07-10 ENCOUNTER — APPOINTMENT (OUTPATIENT)
Dept: ANTEPARTUM | Facility: CLINIC | Age: 34
End: 2024-07-10
Payer: COMMERCIAL

## 2024-07-10 VITALS
HEIGHT: 65 IN | BODY MASS INDEX: 29.16 KG/M2 | DIASTOLIC BLOOD PRESSURE: 73 MMHG | WEIGHT: 175 LBS | SYSTOLIC BLOOD PRESSURE: 111 MMHG

## 2024-07-12 LAB
GP B STREP DNA SPEC QL NAA+PROBE: NOT DETECTED
SOURCE GBS: NORMAL

## 2024-07-15 ENCOUNTER — NON-APPOINTMENT (OUTPATIENT)
Age: 34
End: 2024-07-15

## 2024-07-15 ENCOUNTER — APPOINTMENT (OUTPATIENT)
Dept: OBGYN | Facility: CLINIC | Age: 34
End: 2024-07-15
Payer: COMMERCIAL

## 2024-07-15 VITALS
SYSTOLIC BLOOD PRESSURE: 114 MMHG | BODY MASS INDEX: 29.32 KG/M2 | HEIGHT: 65 IN | WEIGHT: 176 LBS | DIASTOLIC BLOOD PRESSURE: 70 MMHG

## 2024-07-17 LAB
BASOPHILS # BLD AUTO: 0.04 K/UL
BASOPHILS NFR BLD AUTO: 0.5 %
EOSINOPHIL # BLD AUTO: 0.23 K/UL
HCT VFR BLD CALC: 36.7 %
HGB BLD-MCNC: 11.5 G/DL
HIV1+2 AB SPEC QL IA.RAPID: NONREACTIVE
IMM GRANULOCYTES NFR BLD AUTO: 0.3 %
LYMPHOCYTES # BLD AUTO: 1.47 K/UL
LYMPHOCYTES NFR BLD AUTO: 17.1 %
MAN DIFF?: NORMAL
MCHC RBC-ENTMCNC: 28.2 PG
MCHC RBC-ENTMCNC: 31.3 GM/DL
MCV RBC AUTO: 90 FL
MONOCYTES # BLD AUTO: 0.52 K/UL
MONOCYTES NFR BLD AUTO: 6 %
NEUTROPHILS # BLD AUTO: 6.31 K/UL
NEUTROPHILS NFR BLD AUTO: 73.4 %
PLATELET # BLD AUTO: 179 K/UL
RBC # BLD: 4.08 M/UL
RBC # FLD: 13.4 %
T PALLIDUM AB SER QL IA: NEGATIVE
WBC # FLD AUTO: 8.6 K/UL

## 2024-07-22 ENCOUNTER — NON-APPOINTMENT (OUTPATIENT)
Age: 34
End: 2024-07-22

## 2024-07-22 ENCOUNTER — APPOINTMENT (OUTPATIENT)
Dept: OBGYN | Facility: CLINIC | Age: 34
End: 2024-07-22
Payer: COMMERCIAL

## 2024-07-22 ENCOUNTER — APPOINTMENT (OUTPATIENT)
Dept: ANTEPARTUM | Facility: CLINIC | Age: 34
End: 2024-07-22
Payer: COMMERCIAL

## 2024-07-22 ENCOUNTER — ASOB RESULT (OUTPATIENT)
Age: 34
End: 2024-07-22

## 2024-07-22 VITALS
BODY MASS INDEX: 29.66 KG/M2 | SYSTOLIC BLOOD PRESSURE: 128 MMHG | WEIGHT: 178 LBS | DIASTOLIC BLOOD PRESSURE: 70 MMHG | HEIGHT: 65 IN

## 2024-07-22 DIAGNOSIS — Z3A.37 37 WEEKS GESTATION OF PREGNANCY: ICD-10-CM

## 2024-07-29 ENCOUNTER — NON-APPOINTMENT (OUTPATIENT)
Age: 34
End: 2024-07-29

## 2024-07-29 ENCOUNTER — ASOB RESULT (OUTPATIENT)
Age: 34
End: 2024-07-29

## 2024-07-29 ENCOUNTER — APPOINTMENT (OUTPATIENT)
Dept: OBGYN | Facility: CLINIC | Age: 34
End: 2024-07-29
Payer: COMMERCIAL

## 2024-07-29 ENCOUNTER — APPOINTMENT (OUTPATIENT)
Dept: ANTEPARTUM | Facility: CLINIC | Age: 34
End: 2024-07-29
Payer: COMMERCIAL

## 2024-07-29 VITALS
DIASTOLIC BLOOD PRESSURE: 81 MMHG | SYSTOLIC BLOOD PRESSURE: 128 MMHG | HEIGHT: 65 IN | WEIGHT: 178 LBS | BODY MASS INDEX: 29.66 KG/M2

## 2024-07-29 DIAGNOSIS — Z34.93 ENCOUNTER FOR SUPERVISION OF NORMAL PREGNANCY, UNSPECIFIED, THIRD TRIMESTER: ICD-10-CM

## 2024-07-29 DIAGNOSIS — O99.280 ENDOCRINE, NUTRITIONAL AND METABOLIC DISEASES COMPLICATING PREGNANCY, UNSPECIFIED TRIMESTER: ICD-10-CM

## 2024-07-29 DIAGNOSIS — E03.9 ENDOCRINE, NUTRITIONAL AND METABOLIC DISEASES COMPLICATING PREGNANCY, UNSPECIFIED TRIMESTER: ICD-10-CM

## 2024-07-30 ENCOUNTER — APPOINTMENT (OUTPATIENT)
Dept: OBGYN | Facility: CLINIC | Age: 34
End: 2024-07-30

## 2024-08-02 RX ORDER — CHLORHEXIDINE GLUCONATE 500 MG/1
1 CLOTH TOPICAL DAILY
Refills: 0 | Status: DISCONTINUED | OUTPATIENT
Start: 2024-08-04 | End: 2024-08-05

## 2024-08-02 RX ORDER — OXYTOCIN/RINGER'S LACTATE 20/1000 ML
PLASTIC BAG, INJECTION (ML) INTRAVENOUS
Qty: 20 | Refills: 0 | Status: DISCONTINUED | OUTPATIENT
Start: 2024-08-04 | End: 2024-08-06

## 2024-08-02 RX ORDER — DEXTROSE MONOHYDRATE, SODIUM CHLORIDE, SODIUM LACTATE, CALCIUM CHLORIDE, MAGNESIUM CHLORIDE 1.5; 538; 448; 18.4; 5.08 G/100ML; MG/100ML; MG/100ML; MG/100ML; MG/100ML
1000 SOLUTION INTRAPERITONEAL
Refills: 0 | Status: DISCONTINUED | OUTPATIENT
Start: 2024-08-04 | End: 2024-08-05

## 2024-08-02 RX ORDER — TRISODIUM CITRATE DIHYDRATE AND CITRIC ACID MONOHYDRATE 500; 334 MG/5ML; MG/5ML
30 SOLUTION ORAL ONCE
Refills: 0 | Status: DISCONTINUED | OUTPATIENT
Start: 2024-08-04 | End: 2024-08-05

## 2024-08-04 ENCOUNTER — INPATIENT (INPATIENT)
Facility: HOSPITAL | Age: 34
LOS: 1 days | Discharge: HOME | DRG: 951 | End: 2024-08-06
Attending: OBSTETRICS & GYNECOLOGY | Admitting: OBSTETRICS & GYNECOLOGY
Payer: COMMERCIAL

## 2024-08-04 VITALS
TEMPERATURE: 98 F | HEART RATE: 80 BPM | DIASTOLIC BLOOD PRESSURE: 78 MMHG | RESPIRATION RATE: 16 BRPM | SYSTOLIC BLOOD PRESSURE: 120 MMHG

## 2024-08-04 DIAGNOSIS — Z3A.40 40 WEEKS GESTATION OF PREGNANCY: ICD-10-CM

## 2024-08-04 LAB
ABO RH CONFIRMATION: SIGNIFICANT CHANGE UP
BASOPHILS # BLD AUTO: 0.03 K/UL — SIGNIFICANT CHANGE UP (ref 0–0.2)
BASOPHILS NFR BLD AUTO: 0.4 % — SIGNIFICANT CHANGE UP (ref 0–2)
BLD GP AB SCN SERPL QL: SIGNIFICANT CHANGE UP
EOSINOPHIL # BLD AUTO: 0.24 K/UL — SIGNIFICANT CHANGE UP (ref 0–0.5)
EOSINOPHIL NFR BLD AUTO: 3.2 % — SIGNIFICANT CHANGE UP (ref 0–6)
HCT VFR BLD CALC: 36.2 % — SIGNIFICANT CHANGE UP (ref 34.5–45)
HGB BLD-MCNC: 12 G/DL — SIGNIFICANT CHANGE UP (ref 11.5–15.5)
IMM GRANULOCYTES NFR BLD AUTO: 0.4 % — SIGNIFICANT CHANGE UP (ref 0–0.9)
LYMPHOCYTES # BLD AUTO: 1.14 K/UL — SIGNIFICANT CHANGE UP (ref 1–3.3)
LYMPHOCYTES # BLD AUTO: 15.3 % — SIGNIFICANT CHANGE UP (ref 13–44)
MCHC RBC-ENTMCNC: 29 PG — SIGNIFICANT CHANGE UP (ref 27–34)
MCHC RBC-ENTMCNC: 33.1 GM/DL — SIGNIFICANT CHANGE UP (ref 32–36)
MCV RBC AUTO: 87.4 FL — SIGNIFICANT CHANGE UP (ref 80–100)
MONOCYTES # BLD AUTO: 0.32 K/UL — SIGNIFICANT CHANGE UP (ref 0–0.9)
MONOCYTES NFR BLD AUTO: 4.3 % — SIGNIFICANT CHANGE UP (ref 2–14)
NEUTROPHILS # BLD AUTO: 5.71 K/UL — SIGNIFICANT CHANGE UP (ref 1.8–7.4)
NEUTROPHILS NFR BLD AUTO: 76.4 % — SIGNIFICANT CHANGE UP (ref 43–77)
PLATELET # BLD AUTO: 158 K/UL — SIGNIFICANT CHANGE UP (ref 150–400)
RBC # BLD: 4.14 M/UL — SIGNIFICANT CHANGE UP (ref 3.8–5.2)
RBC # FLD: 13.7 % — SIGNIFICANT CHANGE UP (ref 10.3–14.5)
WBC # BLD: 7.47 K/UL — SIGNIFICANT CHANGE UP (ref 3.8–10.5)
WBC # FLD AUTO: 7.47 K/UL — SIGNIFICANT CHANGE UP (ref 3.8–10.5)

## 2024-08-04 RX ORDER — MAGNESIUM HYDROXIDE 400 MG/5ML
30 SUSPENSION, ORAL (FINAL DOSE FORM) ORAL
Refills: 0 | Status: DISCONTINUED | OUTPATIENT
Start: 2024-08-04 | End: 2024-08-06

## 2024-08-04 RX ORDER — LANOLIN 100 %
1 OINTMENT (GRAM) TOPICAL EVERY 6 HOURS
Refills: 0 | Status: DISCONTINUED | OUTPATIENT
Start: 2024-08-04 | End: 2024-08-06

## 2024-08-04 RX ORDER — CRANBERRY FRUIT EXTRACT 650 MG
1 CAPSULE ORAL DAILY
Refills: 0 | Status: DISCONTINUED | OUTPATIENT
Start: 2024-08-04 | End: 2024-08-06

## 2024-08-04 RX ORDER — DIPHENHYDRAMINE HCL 25 MG
25 CAPSULE ORAL EVERY 6 HOURS
Refills: 0 | Status: DISCONTINUED | OUTPATIENT
Start: 2024-08-04 | End: 2024-08-06

## 2024-08-04 RX ORDER — LEVOTHYROXINE SODIUM 175 MCG
75 TABLET ORAL DAILY
Refills: 0 | Status: DISCONTINUED | OUTPATIENT
Start: 2024-08-04 | End: 2024-08-06

## 2024-08-04 RX ORDER — OXYTOCIN/RINGER'S LACTATE 20/1000 ML
41.67 PLASTIC BAG, INJECTION (ML) INTRAVENOUS
Qty: 20 | Refills: 0 | Status: DISCONTINUED | OUTPATIENT
Start: 2024-08-04 | End: 2024-08-06

## 2024-08-04 RX ORDER — DIBUCAINE 1 %
1 OINTMENT (GRAM) TOPICAL EVERY 6 HOURS
Refills: 0 | Status: DISCONTINUED | OUTPATIENT
Start: 2024-08-04 | End: 2024-08-06

## 2024-08-04 RX ORDER — HYDROCORTISONE 1 %
1 CREAM (GRAM) TOPICAL EVERY 6 HOURS
Refills: 0 | Status: DISCONTINUED | OUTPATIENT
Start: 2024-08-04 | End: 2024-08-06

## 2024-08-04 RX ORDER — OXYCODONE HYDROCHLORIDE 30 MG/1
5 TABLET ORAL ONCE
Refills: 0 | Status: DISCONTINUED | OUTPATIENT
Start: 2024-08-04 | End: 2024-08-06

## 2024-08-04 RX ORDER — WITCH HAZEL 500 MG/1
1 CLOTH TOPICAL EVERY 4 HOURS
Refills: 0 | Status: DISCONTINUED | OUTPATIENT
Start: 2024-08-04 | End: 2024-08-06

## 2024-08-04 RX ORDER — KETOROLAC TROMETHAMINE 10 MG
30 TABLET ORAL ONCE
Refills: 0 | Status: DISCONTINUED | OUTPATIENT
Start: 2024-08-04 | End: 2024-08-04

## 2024-08-04 RX ORDER — CLOSTRIDIUM TETANI TOXOID ANTIGEN (FORMALDEHYDE INACTIVATED), CORYNEBACTERIUM DIPHTHERIAE TOXOID ANTIGEN (FORMALDEHYDE INACTIVATED), BORDETELLA PERTUSSIS TOXOID ANTIGEN (GLUTARALDEHYDE INACTIVATED), BORDETELLA PERTUSSIS FILAMENTOUS HEMAGGLUTININ ANTIGEN (FORMALDEHYDE INACTIVATED), BORDETELLA PERTUSSIS PERTACTIN ANTIGEN, AND BORDETELLA PERTUSSIS FIMBRIAE 2/3 ANTIGEN 5; 2; 2.5; 5; 3; 5 [LF]/.5ML; [LF]/.5ML; UG/.5ML; UG/.5ML; UG/.5ML; UG/.5ML
0.5 INJECTION, SUSPENSION INTRAMUSCULAR ONCE
Refills: 0 | Status: DISCONTINUED | OUTPATIENT
Start: 2024-08-04 | End: 2024-08-06

## 2024-08-04 RX ORDER — DEXTROSE MONOHYDRATE, SODIUM CHLORIDE, SODIUM LACTATE, CALCIUM CHLORIDE, MAGNESIUM CHLORIDE 1.5; 538; 448; 18.4; 5.08 G/100ML; MG/100ML; MG/100ML; MG/100ML; MG/100ML
1000 SOLUTION INTRAPERITONEAL ONCE
Refills: 0 | Status: DISCONTINUED | OUTPATIENT
Start: 2024-08-04 | End: 2024-08-06

## 2024-08-04 RX ORDER — IBUPROFEN 200 MG
600 TABLET ORAL EVERY 6 HOURS
Refills: 0 | Status: DISCONTINUED | OUTPATIENT
Start: 2024-08-04 | End: 2024-08-06

## 2024-08-04 RX ORDER — OXYTOCIN/RINGER'S LACTATE 20/1000 ML
PLASTIC BAG, INJECTION (ML) INTRAVENOUS
Qty: 30 | Refills: 0 | Status: DISCONTINUED | OUTPATIENT
Start: 2024-08-04 | End: 2024-08-05

## 2024-08-04 RX ORDER — IBUPROFEN 200 MG
600 TABLET ORAL EVERY 6 HOURS
Refills: 0 | Status: COMPLETED | OUTPATIENT
Start: 2024-08-04 | End: 2025-07-03

## 2024-08-04 RX ORDER — SIMETHICONE 125 MG/1
80 TABLET, CHEWABLE ORAL EVERY 4 HOURS
Refills: 0 | Status: DISCONTINUED | OUTPATIENT
Start: 2024-08-04 | End: 2024-08-06

## 2024-08-04 RX ORDER — ACETAMINOPHEN 500 MG
975 TABLET ORAL
Refills: 0 | Status: DISCONTINUED | OUTPATIENT
Start: 2024-08-04 | End: 2024-08-06

## 2024-08-04 RX ORDER — OXYCODONE HYDROCHLORIDE 30 MG/1
5 TABLET ORAL
Refills: 0 | Status: DISCONTINUED | OUTPATIENT
Start: 2024-08-04 | End: 2024-08-06

## 2024-08-04 RX ORDER — BACTERIOSTATIC SODIUM CHLORIDE 0.9 %
3 VIAL (ML) INJECTION EVERY 8 HOURS
Refills: 0 | Status: DISCONTINUED | OUTPATIENT
Start: 2024-08-04 | End: 2024-08-06

## 2024-08-04 RX ADMIN — Medication 30 MILLIGRAM(S): at 22:48

## 2024-08-04 RX ADMIN — CHLORHEXIDINE GLUCONATE 1 APPLICATION(S): 500 CLOTH TOPICAL at 17:18

## 2024-08-04 RX ADMIN — Medication 2 MILLIUNIT(S)/MIN: at 17:18

## 2024-08-04 NOTE — OB PROVIDER DELIVERY SUMMARY - NSLACERATION_OBGYN_ALL_OB
At [facility], one important tool we use to improve our patient services is our Patient Survey.  Following your visit you may receive our survey in the mail.    Please take the time to complete the survey.    If your visit with us was great, we want to hear about it.    If we can improve, please let us know how.       Patient Education     Viral Upper Respiratory Illness (Child)  Your child has a viral upper respiratory illness (URI), which is another term for the common cold. The virus is contagious during the first few days. It is spread through the air by coughing, sneezing, or by direct contact (touching your sick child then touching your own eyes, nose, or mouth). Frequent handwashing will decrease risk of spread. Most viral illnesses resolve within 7 to 14 days with rest and simple home remedies. However, they may sometimes last up to 4 weeks. Antibiotics will not kill a virus and are generally not prescribed for this condition.    Home care  · Fluids. Fever increases water loss from the body. Encourage your child to drink lots of fluids to loosen lung secretions and make it easier to breathe. For infants under 1 year old, continue regular formula or breast feedings. Between feedings, give oral rehydration solution. This is available from drugstores and grocery stores without a prescription. For children over 1 year old, give plenty of fluids, such as water, juice, gelatin water, soda without caffeine, ginger ale, lemonade, or ice pops.  · Eating. If your child doesn't want to eat solid foods, it's OK for a few days, as long as he or she drinks lots of fluid.  · Rest: Keep children with fever at home resting or playing quietly until the fever is gone. Encourage frequent naps. Your child may return to day care or school when the fever is gone and he or she is eating well and feeling better.  · Sleep. Periods of sleeplessness and irritability are common. A congested child will sleep best with the head  and upper body propped up on pillows or with the head of the bed frame raised on a 6-inch block.   · Cough. Coughing is a normal part of this illness. A cool mist humidifier at the bedside may be helpful. Be sure to clean the humidifier every day to prevent mold. Over-the-counter cough and cold medicines have not proved to be any more helpful than a placebo (syrup with no medicine in it). In addition, these medicines can produce serious side effects, especially in infants under 2 years of age. Do not give over-the-counter cough and cold medicines to children under 6 years unless your healthcare provider has specifically advised you to do so. Also, don’t expose your child to cigarette smoke. It can make the cough worse.  · Nasal congestion. Suction the nose of infants with a bulb syringe. You may put 2 to 3 drops of saltwater (saline) nose drops in each nostril before suctioning. This helps thin and remove secretions. Saline nose drops are available without a prescription. You can also use 1/4 teaspoon of table salt dissolved in 1 cup of water.  · Fever. Use children’s acetaminophen for fever, fussiness, or discomfort, unless another medicine was prescribed. In infants over 6 months of age, you may use children’s ibuprofen or acetaminophen. If your child has chronic liver or kidney disease or has ever had a stomach ulcer or gastrointestinal bleeding, talk with your healthcare provider before using these medicines. Aspirin should never be given to anyone younger than 18 years of age who is ill with a viral infection or fever. It may cause severe liver or brain damage.  · Preventing spread. Washing your hands before and after touching your sick child will help prevent a new infection. It will also help prevent the spread of this viral illness to yourself and other children.  Follow-up care  Follow up with your healthcare provider, or as advised.  When to seek medical advice  For a usually healthy child, call your  child's healthcare provider right away if any of these occur:  · A fever, as follows:  ? Your child is 3 months old or younger and has a fever of 100.4°F (38°C) or higher. Get medical care right away. Fever in a young baby can be a sign of a dangerous infection.  ? Your child is of any age and has repeated fevers above 104°F (40°C).  ? Your child is younger than 2 years of age and a fever of 100.4°F (38°C) continues for more than 1 day.  ? Your child is 2 years old or older and a fever of 100.4°F (38°C) continues for more than 3 days.  · Earache, sinus pain, stiff or painful neck, headache, repeated diarrhea, or vomiting.  · Unusual fussiness.  · A new rash appears.  · Your child is dehydrated, with one or more of these symptoms:  ? No tears when crying.  ? “Sunken” eyes or a dry mouth.  ? No wet diapers for 8 hours in infants.  ? Reduced urine output in older children.  Call 911  Call 911 if any of these occur:  · Increased wheezing or difficulty breathing  · Unusual drowsiness or confusion  · Fast breathing:  ? Birth to 6 weeks: over 60 breaths per minute  ? 6 weeks to 2 years: over 45 breaths per minute  ? 3 to 6 years: over 35 breaths per minute  ? 7 to 10 years: over 30 breaths per minute  ? Older than 10 years: over 25 breaths per minute  Date Last Reviewed: 9/13/2015  © 0310-6199 [employer]. [address] [place] [address], [place] [address]. All rights reserved. This information is not intended as a substitute for professional medical care. Always follow your healthcare professional's instructions.            Yes

## 2024-08-04 NOTE — OB PROVIDER LABOR PROGRESS NOTE - ASSESSMENT
Category II tracing, patient repositioned, FHR improved and back to baseline  Delay pitocin start for 30min then start if no additional decels  Epidural then AROM    D/w Dr. Amaya Category II tracing, patient repositioned, FHR improved and back to baseline  Delay pitocin start for 30min then start if no additional decels  Epidural then AROM    D/w Dr. Amaya    Addendum:    I agree with the Resident Physician's assessment and continued plan of care, as discussed above.      Javi Amaya, DO

## 2024-08-04 NOTE — OB PROVIDER DELIVERY SUMMARY - NSSELHIDDEN_OBGYN_ALL_OB_FT
[[email or web address]_ALL_OB_FT:[id number]],[NS_DeliveryAssist1_OBGYN_ALL_OB_FT:[id number]]

## 2024-08-04 NOTE — OB PROVIDER H&P - ASSESSMENT
Patient is a 33-year-old  at 40w3d by LMP who presents to L&D for IOL at term.     -Admit to L&D  -Consent  -Admission labs  -IV fluids  -Fetus: Cat I tracing. Continuous toco and fetal monitoring.   -GBS: Negative, no GBS ppx required   -Analgesia: epidural if requested  -Plan for vaginal cytotec    Discussed with Dr. Amaya and Dr. Portillo Patient is a 33-year-old  at 40w3d by LMP who presents to L&D for IOL at term.     -Admit to L&D  -Admission labs  -IV fluids  -Fetus: Cat I tracing. Continuous toco and fetal monitoring.   -GBS: Negative, no GBS ppx required   -Analgesia: epidural if requested  -Plan for vaginal cytotec    Discussed with Dr. Amaya     Addendum:    Subjective Hx and Physical Exam reviewed.  I agree with the Resident Physician's assessment and plan of care, as discussed above.  R/B/A of admission for labor management, vaginal delivery with possible  section discussed at length, including medications and options for pain management.  She has no Mu-ism or personal objection to blood transfusion, if necessary.  She was given the opportunity to ask questions and all were addressed.  She understands the plan of care.    Javi Amaya, DO

## 2024-08-04 NOTE — OB PROVIDER H&P - NSHPPHYSICALEXAM_GEN_ALL_CORE
Vital Signs Last 24 Hrs  T(C): 36.9 (04 Aug 2024 11:49), Max: 36.9 (04 Aug 2024 10:37)  T(F): 98.4 (04 Aug 2024 11:49), Max: 98.4 (04 Aug 2024 10:37)  HR: 80 (04 Aug 2024 11:49) (80 - 80)  BP: 120/78 (04 Aug 2024 11:49) (120/78 - 120/78)  BP(mean): --  RR: 16 (04 Aug 2024 11:49) (16 - 16)  SpO2: --    Parameters below as of 04 Aug 2024 11:49  Patient On (Oxygen Delivery Method): room air    T(C): 36.9 (08-04-24 @ 11:49), Max: 36.9 (08-04-24 @ 10:37)  HR: 80 (08-04-24 @ 11:49) (80 - 80)  BP: 120/78 (08-04-24 @ 11:49) (120/78 - 120/78)  RR: 16 (08-04-24 @ 11:49) (16 - 16)  SpO2: --    Gen: NAD, well-appearing, AAOx3   Abd: Soft, gravid  Ext: non-tender, non-edematous  SSE: 2/70/-2  Bedside sono: cephalic  FHT: 130bpm baseline, moderate variability, + accels, no decels  Carlyss: occasional

## 2024-08-04 NOTE — OB PROVIDER H&P - HISTORY OF PRESENT ILLNESS
KB WHITAKER is a 34yo  at 40w3d by LMP who presents to L&D for IOL at term.      Denies leakage of fluids, vaginal bleeding, painful contractions. Reports active fetal movement.   Denies fever, chills, nausea, vomiting, dysuria, abnormal vaginal discharge.   Denies HA, RUQ pain, vision changes, increased leg swelling.     PNC @Dr Amaya    MARY:  24     LMP: 10/26/23   Pregnancy course: hypothyroidism in pregnancy, on Synthroid    Obhx: 2022 @41w, 8 lbs 8 oz, M, [facility], c/b PPH   Gynhx: denies fibroids, cysts, STIs, abnl paps   Pmhx: hypothyroidism   Pshx: repair of L Achilles tendon   Meds: PNV, Levothyroxine Sodium 75 MCG, ASA   Allergies: cephalosporins (hives), treenuts (anaphylaxis), peanuts (anaphylaxis)   Social Hx: denies tobacco, recreational drug, alcohol use during pregnancy. Feels safe at home       Ultrasound: cephalic, anterior placenta (previously reported as posterior)   EFW: 3159 gm    6 lb 15 oz       51 % on 7/10   PPBC: unknown      Vitals:   T(C): --  T(F): --  HR: 80 (24 @ 10:37) (80 - 80)  BP: 120/78 (24 @ 10:37) (120/78 - 120/78)  RR: --  SpO2: --    General: AAOx3, NAD  Abd: Soft, nontender, gravid  SVE: ***    BMI:       FHT: **  [place]:  **    MFM sono: **  Bedside sono: **    A/P:     Admission labs  Consent  GBS neg, no ppx  Cephalic, intact, SVE**  Yina regularly  FHT category I  Plan for **  Analgesia PRN    To be discussed with Dr Amaya KB WHITAKER is a 34yo  at 40w3d by LMP who presents to L&D for IOL at term.      Denies leakage of fluids, vaginal bleeding, painful contractions. Reports active fetal movement.   Denies fever, chills, nausea, vomiting, dysuria, abnormal vaginal discharge.   Denies HA, RUQ pain, vision changes, increased leg swelling.     PNC @Dr Amaya    MARY:  24     LMP: 10/26/23   Pregnancy course: hypothyroidism in pregnancy, on Synthroid    Obhx: 2022 @41w, 8 lbs 8 oz, M, [facility], c/b PPH   Gynhx: denies fibroids, cysts, STIs, abnl paps   Pmhx: hypothyroidism   Pshx: repair of L Achilles tendon   Meds: PNV, Levothyroxine Sodium 75 MCG, ASA   Allergies: cephalosporins (hives), treenuts (anaphylaxis), peanuts (anaphylaxis)   Social Hx: denies tobacco, recreational drug, alcohol use during pregnancy. Feels safe at home       Ultrasound: cephalic, anterior placenta (previously reported as posterior)   EFW: 3159 gm    6 lb 15 oz       51 % on 7/10   PPBC: unknown

## 2024-08-04 NOTE — CHART NOTE - NSCHARTNOTEFT_GEN_A_CORE
Pt here for scheduled late term induction  See H+P for details  vaginal cytotec placed; pt tolerated well  VE: 2/70/-2  NST; baseline 150, +accel, no decel moderate variability  [place]: irregular contractions.    SAILAJA Leroy (OB hospitalist)

## 2024-08-04 NOTE — OB NEONATOLOGY/PEDIATRICIAN DELIVERY SUMMARY - NSPEDSNEONOTESA_OBGYN_ALL_OB_FT
OB requested me to attend  at 40.3 wks due to light meconium. The mother is 32 y/o, , O+, HIV NR, HBsAg NR, RPR NR, RI, GBS NEG  L & D: light MSAF, vigorous, bulb suctioned and dried, APGAR 9 & 9, BW: 4445gm  Asst: FTLGA, BB, , MSAF  Plan: f/u A/C, if stable admit to NBN

## 2024-08-04 NOTE — OB PROVIDER DELIVERY SUMMARY - NS_GESTAGEATBIRTHA_OBGYN_ALL_OB_FT
40w3d Detail Level: Detailed Add 24881 Cpt? (Important Note: In 2017 The Use Of 54681 Is Being Tracked By Cms To Determine Future Global Period Reimbursement For Global Periods): no Wound Evaluated By (Optional): Dr. Langston Wound Diameter In Cm(Optional): 0 Wound Crusting?: clean Sutures?: intact Wound Color?: pink

## 2024-08-04 NOTE — OB RN PATIENT PROFILE - NSPROMEDSBROUGHTTOHOSP_GEN_A_NUR
Problem: Physical Therapy  Goal: Physical Therapy Goal  Description: Short Term Goals  1. Patient will complete 30 reps of B LE exercises with correct form.   2. Patient will complete sit<>stand transfers with SBA.  3. Patient will ambulate 100 feet with RW on level surfaces with CGA.     Long Term Goals   1. Patient will ambulate 300 feet with RW on level and unlevel surfaces with SBA.  2. Patient will complete all functional transfers with MOD I.  3. L knee AROM 0-110.   4. Patient will negotiate up and down 5 stairs with use of handrail with SBA.     Outcome: Ongoing, Progressing   Continue POC per PT order to progress patient toward rehab goals as tolerated by patient.  DANNIE Angel 9/21/2022     no

## 2024-08-04 NOTE — OB PROVIDER H&P - NS_EFFACEMANT_OBGYN_ALL_OB_NU
Okay to order  
Okay to send in pravastatin 10mg to take once a day   Fill for 3 months with 3 refills  Please send to preferred pharmacy  Repeat lipid and hepatic panel in 3 months  
Shows contraindication ok to order  
To Dr Ohara- please advise  
70

## 2024-08-04 NOTE — OB PROVIDER DELIVERY SUMMARY - NSPROVIDERDELIVERYNOTE_OBGYN_ALL_OB_FT
Patient felt rectal pressure and was found to be fully dilated. She pushed effectively and delivered a viable male infant at 21:22. Vertex delivered without difficulty, no nuchal cord noted, shoulders then delivered without difficulty. Placenta delivered spontaneously and intact at 21:25. Pitocin started. Excellent hemostasis was achieved. Uterus, cervix, perineum and vagina were inspected and a second degree tear was noted and repaired with suture.     Apgars: 9/9  EBL: 202

## 2024-08-04 NOTE — OB RN DELIVERY SUMMARY - NSSELHIDDEN_OBGYN_ALL_OB_FT
[NS_DeliveryAttending1_OBGYN_ALL_OB_FT:[email or web address]],[NS_DeliveryAssist1_OBGYN_ALL_OB_FT:[id number]],[NS_DeliveryRN_OBGYN_ALL_OB_FT:[id number]]

## 2024-08-04 NOTE — OB RN DELIVERY SUMMARY - NS_SEPSISRSKCALC_OBGYN_ALL_OB_FT
EOS calculated successfully. EOS Risk Factor: 0.5/1000 live births ([facility] national incidence); GA=40w3d; Temp=98.42; ROM=0.533; GBS='Negative'; Antibiotics='No antibiotics or any antibiotics < 2 hrs prior to birth'

## 2024-08-05 ENCOUNTER — TRANSCRIPTION ENCOUNTER (OUTPATIENT)
Age: 34
End: 2024-08-05

## 2024-08-05 LAB
HCT VFR BLD CALC: 32.7 % — LOW (ref 34.5–45)
HGB BLD-MCNC: 10.7 G/DL — LOW (ref 11.5–15.5)
MEV IGG SER-ACNC: 6.02 AU/ML — SIGNIFICANT CHANGE UP
MEV IGG+IGM SER-IMP: NEGATIVE — SIGNIFICANT CHANGE UP
T PALLIDUM AB TITR SER: NEGATIVE — SIGNIFICANT CHANGE UP

## 2024-08-05 RX ADMIN — Medication 600 MILLIGRAM(S): at 08:51

## 2024-08-05 RX ADMIN — Medication 975 MILLIGRAM(S): at 18:09

## 2024-08-05 RX ADMIN — Medication 600 MILLIGRAM(S): at 15:28

## 2024-08-05 RX ADMIN — Medication 975 MILLIGRAM(S): at 12:10

## 2024-08-05 RX ADMIN — Medication 975 MILLIGRAM(S): at 00:45

## 2024-08-05 RX ADMIN — Medication 975 MILLIGRAM(S): at 05:50

## 2024-08-05 RX ADMIN — Medication 3 MILLILITER(S): at 06:07

## 2024-08-05 RX ADMIN — Medication 75 MICROGRAM(S): at 05:50

## 2024-08-05 RX ADMIN — Medication 600 MILLIGRAM(S): at 03:34

## 2024-08-05 RX ADMIN — Medication 1 TABLET(S): at 12:10

## 2024-08-05 RX ADMIN — Medication 600 MILLIGRAM(S): at 21:12

## 2024-08-05 NOTE — DISCHARGE NOTE OB - PATIENT PORTAL LINK FT
You can access the FollowMyHealth Patient Portal offered by [facility] by registering at the following website: [email or web address]. By joining [employer]’s FollowMyHealth portal, you will also be able to view your health information using other applications (apps) compatible with our system.

## 2024-08-05 NOTE — DISCHARGE NOTE OB - CARE PROVIDER_API CALL
Javi Amaya  Obstetrics and Gynecology  [address], [address]  [place] [address]  Phone: [phone]  Fax: [phone]  Established Patient  Follow Up Time: 1 month

## 2024-08-05 NOTE — DISCHARGE NOTE OB - CRACKED, BLEEDING NIPPLES
Name: Nicolas Wills      : 1989      MRN: [id number]  Encounter Provider: Boyd Jimenez MD  Encounter Date: 2023   Encounter department: [address] [place] [address]     1. Benign essential HTN  Assessment & Plan:  Mild elevation in blood pressure possibly secondary to the hormonal treatment that she was given for her ovarian cyst.  We will add a small dose of Dyazide to be taken when the blood pressure is higher than 120/80. Patient has a blood pressure cuff at home and can monitor it    Orders:  -     hydrochlorothiazide (HYDRODIURIL) 12.5 mg tablet; Take 1 tablet (12.5 mg total) by mouth daily As needed based on blood pressure. Target is 120/80 mmHg    2. Gastroesophageal reflux disease, unspecified whether esophagitis present  Assessment & Plan:  Symptoms are well controlled with Pepcid 40 mg daily recommend continued follow-up with gastroenterology      3. Tobacco use disorder  Assessment & Plan:  Strong recommendation for smoking cessation with multiple risk factors. Every day smoker for last 14 years      4. Major depressive disorder, recurrent episode, moderate (HCC)  Assessment & Plan:  Recommend continued follow-up with mental health provider      5. Anxiety and depression    6. Dyslipidemia, goal LDL below 160    7. Family history of heart murmur  -     Ambulatory Referral to Cardiology; Future           Subjective     Chief Complaint   Patient presents with   • Follow-up     Pt here for 4 month f/u    • Results     Lab work    • Weight Loss     HPI   New ovarian cyst diagnosed yesterday. History of PCOS. Sonogram was done for surveillance. She is already on Metformin, 1500 mg daily  She does not have any abdominal pain from it. Concerned about her weight. In a program for weight loss. Which allows her 1600 thai a day. She walks 3 miles a day. She also does high intensity training 4 days a week.   Additionally she does 2 days a week of [place]. She does follow with a nutritionist at work and tracks her calories rigorously. She is mildly frustrated and tearful with her inability to lose weight    Review of Systems   Constitutional: Positive for unexpected weight change. Gastrointestinal: Positive for nausea. Psychiatric/Behavioral: Positive for dysphoric mood. All other systems reviewed and are negative.       Past Medical History:   Diagnosis Date   • Arthritis    • Atypical squamous cell changes of undetermined significance (ASCUS) on cervical cytology with negative high risk human papilloma virus (HPV) test result    • High cholesterol    • Ovarian cyst 06/30/2022    bilateral   • PCOS (polycystic ovarian syndrome)      Past Surgical History:   Procedure Laterality Date   • WISDOM TOOTH EXTRACTION  2007     Family History   Problem Relation Age of Onset   • Hypertension Mother    • Other Mother         vaginal tumors   • Heart disease Mother    • Cancer Mother    • Skin cancer Father    • Heart Valve Disease Father         leaky valve   • Diabetes Maternal Grandmother    • Kidney failure Maternal Grandmother    • Ovarian cancer Paternal Grandmother    • Lung cancer Paternal Grandfather    • Other Paternal Aunt         Ovarian polys     Social History     Socioeconomic History   • Marital status: Single     Spouse name: None   • Number of children: None   • Years of education: None   • Highest education level: None   Occupational History   • Occupation: Teacher   Tobacco Use   • Smoking status: Every Day     Packs/day: 0.25     Years: 14.00     Total pack years: 3.50     Types: Cigarettes     Start date: 2004   • Smokeless tobacco: Never   • Tobacco comments:     3 cigarettes daily   Vaping Use   • Vaping Use: Never used   Substance and Sexual Activity   • Alcohol use: Never   • Drug use: Never   • Sexual activity: Yes   Other Topics Concern   • None   Social History Narrative   • None     Social Determinants of Health     Financial Resource Strain: Not on file   Food Insecurity: Not on file   Transportation Needs: Not on file   Physical Activity: Not on file   Stress: Not on file   Social Connections: Not on file   Intimate Partner Violence: Not on file   Housing Stability: Not on file     Current Outpatient Medications on File Prior to Visit   Medication Sig   • aluminum chloride (DRYSOL) 20 % external solution Use daily as needed. • buPROPion (Wellbutrin SR) 150 mg 12 hr tablet Take 1 tablet (150 mg total) by mouth 2 (two) times a day   • calcium carbonate (TUMS) 500 mg chewable tablet Chew 2 tablets daily     • carvedilol (COREG) 25 mg tablet Take 1 tablet (25 mg total) by mouth 2 (two) times a day with meals   • chlorhexidine (PERIDEX) 0.12 % solution FILL CAP TO LINE AND RINSE WITH CONTENTS (1/2 OUNCE) BY MOUTH TWICE DAILY   • famotidine (PEPCID) 40 MG tablet Take 1 tablet (40 mg total) by mouth in the morning   • metFORMIN (GLUCOPHAGE-XR) 500 mg 24 hr tablet Take 500 mg by mouth 3 (three) times a day    • norethindrone-ethinyl estradiol-ferrous fumarate (Junel Fe 24) 1-20 MG-MCG(24) per tablet Take 1 tablet by mouth daily Start Sunday     Allergies   Allergen Reactions   • Bee Venom Swelling     Large local reaction   • Nicotine Other (See Comments)     Pt had severe "night terrors" when using nicotine patch   • Other      Nicotene patches  Fleas   • Clarithromycin Rash   • Clindamycin Rash   • Erigeron Annuus Rash     Fleas. ..large hives     Immunization History   Administered Date(s) Administered   • COVID-19 J&J (TradeBlock) vaccine 0.5 mL 03/21/2021, 12/04/2021   • DTP 1989, 1989, 1989, 12/04/1990, 05/24/1994   • HPV Quadrivalent 10/25/2013, 11/22/2013, 04/01/2014   • Hep B, adult 01/28/1994, 05/24/1994, 06/27/1994   • Hepatitis B 01/28/1994, 05/24/1994, 06/27/1994   • HiB 08/29/1990   • INFLUENZA 10/30/1996, 12/05/1996, 10/25/2013, 11/21/2014, 10/01/2017, 10/15/2020, 11/05/2021, 01/18/2023   • Influenza Quadrivalent 3 years and older 10/30/1996, 12/05/1996   • Influenza, injectable, quadrivalent, preservative free 0.5 mL 11/05/2020, 01/18/2023   • Influenza, seasonal, injectable 10/25/2013, 11/21/2014, 12/05/2015, 10/01/2017   • MMR 08/29/1990, 10/24/1996   • Meningococcal Conjugate (MCV4O) 07/28/2008   • Meningococcal MCV4P 07/28/2008   • OPV 1989, 1989, 12/04/1990, 05/24/1994   • Pneumococcal Polysaccharide PPV23 01/22/2018   • Td (adult), adsorbed 05/09/2000   • Tdap 10/25/2013   • Tuberculin Skin Test 06/12/1990, 04/29/1993, 05/24/1994   • Tuberculin Skin Test-PPD Intradermal 06/12/1990, 04/29/1993, 05/24/1994, 02/24/1998, 04/20/2000, 07/28/2008, 02/06/2012, 03/26/2019   • Varicella 06/19/2001, 07/29/2009       Objective     /80 (BP Location: Left arm, Patient Position: Sitting, Cuff Size: Standard)   Pulse 70   Temp 98.7 °F (37.1 °C) (Temporal)   Ht 5' 8" (1.727 m)   Wt 118 kg (260 lb)   SpO2 98%   BMI 39.53 kg/m²     Physical Exam     Gen: NAD, mildly tearful  Heent: atraumatic, normocephalic  Mouth: is moist  Neck: is supple, no JVD, no carotid bruits.    Heart: is regular Normal s1, s2,  Lungs: are clear to auscultation  Abd: soft, non tender, NABS  Ext: no edema, distal pulses normal  Skin: no rashes, ulcers  Mood: emotional affect  Neuro: Rishabh Jackson MD Statement Selected

## 2024-08-05 NOTE — DISCHARGE NOTE OB - CARE PLAN
Principal Discharge DX:	Normal vaginal delivery  Assessment and plan of treatment:	Please call your provider to schedule postpartum visit in 6 weeks. Take medications as directed, regular diet, activity as tolerated. Exclusive breast feeding for the first 6 months is recommended. Nothing per vagina for 6 weeks (incl. sex, douching, etc). If you have additional concerns, please inform your provider.   1

## 2024-08-05 NOTE — DISCHARGE NOTE OB - MATERIALS PROVIDED
New Beginnings/Vaccinations/[facility] Pope Screening Program/  Immunization Record/Breastfeeding Log/Breastfeeding Mother’s Support Group Information/Guide to Postpartum Care/[facility] Hearing Screen Program/Back To Sleep Handout/Shaken Baby Prevention Handout/Breastfeeding Guide and Packet/Birth Certificate Instructions

## 2024-08-05 NOTE — PROGRESS NOTE ADULT - ASSESSMENT
A/P:  KB WHITAKER is a 33y  now PPD#1 s/p spontaneous vaginal delivery at 40w3d gestation, uncomplicated.    -Vital signs stable  -Hgb: 12 -> AM labs pending   -Voiding, tolerating PO, bowel function nml   -Advance care as tolerated   -Continue routine postpartum care and education  -Healthy male infant, desires circumcision  -Dispo: Pt is stable, doing well and meeting all postpartum milestones. Possible discharge to home today pending attending approval.   A/P:  KB WHITAKER is a 33y  now PPD#1 s/p spontaneous vaginal delivery at 40w3d gestation, uncomplicated.    -Vital signs stable  -Hgb: 12 -> AM labs pending   -Voiding, tolerating PO, bowel function nml   -Advance care as tolerated   -Continue routine postpartum care and education  -Healthy male infant, desires circumcision  -Dispo: Pt is stable, doing well and meeting all postpartum milestones. Possible discharge to home today pending attending approval.    Addendum:    Subjective Hx, Physical Exam, & Laboratory results reviewed and Pt seen and examined at bedside.  I agree with the Resident Physician's assessment and plan of care, as discussed above.  She was given the opportunity to ask questions and all were addressed.    Javi Amaya, DO

## 2024-08-05 NOTE — DISCHARGE NOTE OB - MEDICATION SUMMARY - MEDICATIONS TO TAKE
I will START or STAY ON the medications listed below when I get home from the hospital:    ibuprofen 600 mg oral tablet  -- 1 tab(s) by mouth every 6 hours  -- Indication: For pain    Tylenol 325 mg oral tablet  -- 2 tab(s) by mouth every 6 hours  -- Indication: For pain    Prenatabs Rx oral tablet  -- 1 tab(s) by mouth once a day  -- Indication: For Healthy mom

## 2024-08-05 NOTE — DISCHARGE NOTE OB - NS MD DC FALL RISK RISK
For information on Fall & Injury Prevention, visit: [email or web address] OR  [email or web address] OR  https://www.cdc.gov/steadi/patient.html

## 2024-08-05 NOTE — PROGRESS NOTE ADULT - ASSESSMENT
INTERVAL HPI/OVERNIGHT EVENTS:  33y Female s/p labor epidural on 8/4/24    Vital Signs Last 24 Hrs  T(C): 37.1 (05 Aug 2024 03:50), Max: 37.1 (05 Aug 2024 03:50)  T(F): 98.7 (05 Aug 2024 03:50), Max: 98.7 (05 Aug 2024 03:50)  HR: 80 (05 Aug 2024 03:50) (61 - 91)  BP: 107/67 (05 Aug 2024 03:50) (99/65 - 233/-)  BP(mean): --  RR: 18 (05 Aug 2024 03:50) (14 - 18)  SpO2: 97% (05 Aug 2024 03:50) (90% - 100%)    Parameters below as of 05 Aug 2024 03:50  Patient On (Oxygen Delivery Method): room air            Patient's overall anesthesia satisfaction: Positive    Patient doing well     No headache      No residual numbness or weakness, sensory and motor function intact    No anesthetic complications or complaints noted or reported

## 2024-08-06 VITALS
OXYGEN SATURATION: 98 % | DIASTOLIC BLOOD PRESSURE: 69 MMHG | RESPIRATION RATE: 18 BRPM | HEART RATE: 66 BPM | SYSTOLIC BLOOD PRESSURE: 108 MMHG | TEMPERATURE: 98 F

## 2024-08-06 RX ORDER — CRANBERRY FRUIT EXTRACT 650 MG
1 CAPSULE ORAL
Qty: 30 | Refills: 0
Start: 2024-08-06 | End: 2024-09-04

## 2024-08-06 RX ORDER — ACETAMINOPHEN 500 MG
2 TABLET ORAL
Qty: 56 | Refills: 0
Start: 2024-08-06 | End: 2024-08-12

## 2024-08-06 RX ORDER — MEASELS, MUMPS, AND RUBELLA VACCINE, LIVE 3.4-4.2
0.5 KIT SUBCUTANEOUS ONCE
Refills: 0 | Status: COMPLETED | OUTPATIENT
Start: 2024-08-06 | End: 2024-08-06

## 2024-08-06 RX ORDER — IBUPROFEN 200 MG
1 TABLET ORAL
Qty: 28 | Refills: 0
Start: 2024-08-06 | End: 2024-08-12

## 2024-08-06 RX ADMIN — Medication 600 MILLIGRAM(S): at 09:37

## 2024-08-06 RX ADMIN — Medication 75 MICROGRAM(S): at 05:46

## 2024-08-06 RX ADMIN — MEASELS, MUMPS, AND RUBELLA VACCINE, LIVE 0.5 MILLILITER(S): KIT at 09:50

## 2024-08-06 RX ADMIN — Medication 975 MILLIGRAM(S): at 00:14

## 2024-08-06 RX ADMIN — Medication 600 MILLIGRAM(S): at 10:07

## 2024-08-06 RX ADMIN — Medication 975 MILLIGRAM(S): at 05:46

## 2024-08-06 NOTE — PROGRESS NOTE ADULT - ASSESSMENT
A/P:  KB WHITAKER is a 33y  now PPD#2 s/p spontaneous vaginal delivery at 40 weeks 3 days gestation, uncomplicated.    -Vital signs stable  -Hgb: 12 -> 10.7  -Voiding, tolerating PO, bowel function nml   -Advance care as tolerated   -Continue routine postpartum care and education  -Healthy male infant, desires circumcision  -Dispo: Pt is stable, doing well and meeting all postpartum milestones. Possible discharge to home today pending attending approval.       A/P:  KB WHITAKER is a 33y  now PPD#2 s/p spontaneous vaginal delivery at 40 weeks 3 days gestation, uncomplicated.    -Vital signs stable  -Hgb: 12 -> 10.7  -Voiding, tolerating PO, bowel function nml   -Advance care as tolerated   -Continue routine postpartum care and education  -Healthy male infant, circumcision complete  -Dispo: Pt is stable, doing well and meeting all postpartum milestones. Possible discharge to home today pending attending approval.    Addendum:    Subjective Hx, Physical Exam, & Laboratory results reviewed and Pt seen and examined at bedside.  I agree with the Resident Physician's assessment and plan of care, as discussed above.  She was given the opportunity to ask questions and all were addressed.    Javi Amaya, DO

## 2024-08-06 NOTE — PROGRESS NOTE ADULT - SUBJECTIVE AND OBJECTIVE BOX
KB WHITAKER is a 33y  now PPD#1 s/p spontaneous vaginal delivery at 40w3d gestation, uncomplicated.    S:    No acute events overnight.   The patient has no complaints.  Pain controlled with current treatment regimen.   She is ambulating without difficulty and tolerating PO.   - flatus/-BM/+ voiding   She endorses appropriate lochia, which is decreasing.   She denies fevers, chills, nausea and vomiting.   She denies headache, chest pain and SOB.     O:    T(C): 37.1 (24 @ 03:50), Max: 37.1 (24 @ 03:50)  HR: 80 (24 @ 03:50) (61 - 91)  BP: 107/67 (24 @ 03:50) (99/65 - 233/-)  RR: 18 (24 @ 03:50) (14 - 18)  SpO2: 97% (24 @ 03:50) (90% - 100%)    Gen: NAD, AOx3  Pulm: Normal respiratory effort on RA  Abdomen:  Soft, non-tender, non-distended  Uterus:  Fundus firm below umbilicus  Ext:  Non-tender and non-edematous                          12.0   7.47  )-----------( 158      ( 04 Aug 2024 11:20 )             36.2             
KB WHITAKER is a 33y  now PPD#2 s/p spontaneous vaginal delivery at 40 weeks 3 days gestation, uncomplicated.    S:    No acute events overnight.   The patient has no complaints.  Pain controlled with current treatment regimen.   She is ambulating without difficulty and tolerating PO.   + flatus/-BM/+ voiding   She endorses appropriate lochia, which is decreasing.   She is breastfeeding.  She denies fevers, chills, nausea and vomiting.   She denies headache, chest pain and SOB.     O:    T(C): 36.5 (24 @ 04:36), Max: 37 (24 @ 15:10)  HR: 66 (24 @ 04:36) (66 - 84)  BP: 108/69 (24 @ 04:36) (108/69 - 114/73)  RR: 18 (24 @ 04:36) (18 - 18)  SpO2: 98% (24 @ 04:36) (98% - 98%)    Gen: NAD, AOx3  Pulm: Normal respiratory effort on RA  Abdomen:  Soft, non-tender, non-distended  Uterus:  Fundus firm below umbilicus  Ext:  Non-tender and non-edematous                          10.7   x     )-----------( x        ( 05 Aug 2024 06:06 )             32.7

## 2024-08-12 PROBLEM — E03.9 HYPOTHYROIDISM, UNSPECIFIED: Chronic | Status: ACTIVE | Noted: 2024-08-04

## 2024-09-17 ENCOUNTER — APPOINTMENT (OUTPATIENT)
Dept: OBGYN | Facility: CLINIC | Age: 34
End: 2024-09-17
Payer: COMMERCIAL

## 2024-09-17 VITALS
WEIGHT: 151 LBS | SYSTOLIC BLOOD PRESSURE: 113 MMHG | HEIGHT: 65 IN | DIASTOLIC BLOOD PRESSURE: 70 MMHG | BODY MASS INDEX: 25.16 KG/M2

## 2024-09-17 DIAGNOSIS — E03.9 ENDOCRINE, NUTRITIONAL AND METABOLIC DISEASES COMPLICATING PREGNANCY, UNSPECIFIED TRIMESTER: ICD-10-CM

## 2024-09-17 DIAGNOSIS — Z34.93 ENCOUNTER FOR SUPERVISION OF NORMAL PREGNANCY, UNSPECIFIED, THIRD TRIMESTER: ICD-10-CM

## 2024-09-17 DIAGNOSIS — O99.280 ENDOCRINE, NUTRITIONAL AND METABOLIC DISEASES COMPLICATING PREGNANCY, UNSPECIFIED TRIMESTER: ICD-10-CM

## 2024-09-22 NOTE — PLAN
[FreeTextEntry1] : She is doing well in the postpartum period and was counseled on expectations regarding the return of her menstrual period status post vaginal delivery and while breast-feeding.  She does not wish for contraception at this time, but understands it may be made available upon request.  She return to office in 3 months time for her annual well woman appointment.  She was given opportunist questions all questions were addressed.

## 2024-09-22 NOTE — COUNSELING
[Nutrition/ Exercise/ Weight Management] : nutrition, exercise, weight management [Vitamins/Supplements] : vitamins/supplements [Contraception/ Emergency Contraception/ Safe Sexual Practices] : contraception, emergency contraception, safe sexual practices [Confidentiality] : confidentiality [Medication Management] : medication management

## 2024-09-22 NOTE — HISTORY OF PRESENT ILLNESS
[FreeTextEntry1] : 33-year-old female -0-0-2 presenting office for her post partum appointment status post vaginal delivery 2024 at 40 weeks and 3 days delivering a baby boy weighing 4450 g with Apgars 9 and 9.  Delivery significant for secondary midline laceration which was repaired in normal fashion.  She is currently breast-feeding her menstrual period has not returned and she does not wish for contraception at this time.

## 2024-12-17 ENCOUNTER — APPOINTMENT (OUTPATIENT)
Dept: OBGYN | Facility: CLINIC | Age: 34
End: 2024-12-17
Payer: COMMERCIAL

## 2024-12-17 VITALS
HEIGHT: 65 IN | DIASTOLIC BLOOD PRESSURE: 70 MMHG | SYSTOLIC BLOOD PRESSURE: 110 MMHG | BODY MASS INDEX: 23.32 KG/M2 | WEIGHT: 140 LBS

## 2024-12-17 DIAGNOSIS — K64.9 UNSPECIFIED HEMORRHOIDS: ICD-10-CM

## 2024-12-17 DIAGNOSIS — Z01.419 ENCOUNTER FOR GYNECOLOGICAL EXAMINATION (GENERAL) (ROUTINE) W/OUT ABNORMAL FINDINGS: ICD-10-CM

## 2024-12-17 PROCEDURE — 99213 OFFICE O/P EST LOW 20 MIN: CPT | Mod: 25

## 2024-12-17 PROCEDURE — 99385 PREV VISIT NEW AGE 18-39: CPT

## 2024-12-17 RX ORDER — HYDROCORTISONE VALERATE 2 MG/G
0.2 OINTMENT TOPICAL
Qty: 1 | Refills: 3 | Status: ACTIVE | COMMUNITY
Start: 2024-12-17 | End: 1900-01-01

## 2024-12-17 NOTE — PLAN
[FreeTextEntry1] : Clinical breast exam performed gently secondary to breast-feeding status and self breast examination along with indications for early screening reviewed with patient.  Cervical Pap smear performed and importance of maintaining yearly well reports and cervical Pap smears reviewed.  Her menstrual period has not returned and she was counseled on expectations regarding the return of her menstrual period while breast-feeding and status post vaginal delivery.  She does not wish for contraception at this time, but understands it may be made available at her request.  She is to continue with her PCP and endocrinologist, as directed.  Additional concern regarding her hemorrhoids status post vaginal delivery and after Risks, benefits, and alternatives of options were discussed at length and Rx for cortisone ointment was sent to pharmacy with direction.  Patient was advised if they begin to affect her daily life she should seek consultation from a gastric surgeon and a referral can be provided at her request.  She is given opportunity ask questions all questions were addressed she may return to office in 1 years time, or as needed.

## 2024-12-17 NOTE — HISTORY OF PRESENT ILLNESS
[FreeTextEntry1] : 34-year-old female -0-0-2 presenting to office for her annual well woman appointment and additional concern regarding her hemorrhoid status post vaginal delivery this past August.  She would like to discuss her options and possible medications.  She is currently breast-feeding and her menstrual period has not returned.  She does not desire contraception at this time.  Obstetric history of 2 prior vaginal deliveries the last being 2024.  Denies gynecologic history of uterine fibroids, ovarian cyst, STIs, or abnormal cervical Pap smears.  Surgical history of wisdom tooth extraction.  Medical history significant for hypothyroidism and she is currently followed by endocrinology and is on levothyroxine 50 daily and 75 on the weekends.  Family history significant for prostate cancer in her father.  Denies alcohol and tobacco, illicit drug use.  Allergy to cephalosporins which resulted in hives.

## 2024-12-20 LAB — HPV HIGH+LOW RISK DNA PNL CVX: NOT DETECTED

## 2024-12-27 LAB — CYTOLOGY CVX/VAG DOC THIN PREP: NORMAL

## 2025-04-24 NOTE — OB RN PATIENT PROFILE - PRO PRENATAL LABS ORI SOURCE HBSAG
Can we assist patient moving her appointment up?  Needs surgical clearance prior to 5/15/2025 for urology.  Can use HARVINDER Coates MD, 04/24/25, 2:06 PM   
Outgoing call to patient's daughter to schedule pre-op appt. Patient has been scheduled for 5/8/25. No further action is required  
hard copy, drawn during this pregnancy